# Patient Record
Sex: FEMALE | Race: WHITE | NOT HISPANIC OR LATINO | Employment: UNEMPLOYED | URBAN - NONMETROPOLITAN AREA
[De-identification: names, ages, dates, MRNs, and addresses within clinical notes are randomized per-mention and may not be internally consistent; named-entity substitution may affect disease eponyms.]

---

## 2018-09-17 ENCOUNTER — OFFICE VISIT (OUTPATIENT)
Dept: INTERNAL MEDICINE | Facility: CLINIC | Age: 43
End: 2018-09-17

## 2018-09-17 VITALS
HEIGHT: 64 IN | OXYGEN SATURATION: 99 % | TEMPERATURE: 98.2 F | SYSTOLIC BLOOD PRESSURE: 110 MMHG | WEIGHT: 134 LBS | HEART RATE: 91 BPM | DIASTOLIC BLOOD PRESSURE: 82 MMHG | BODY MASS INDEX: 22.88 KG/M2

## 2018-09-17 DIAGNOSIS — R53.83 FATIGUE, UNSPECIFIED TYPE: ICD-10-CM

## 2018-09-17 DIAGNOSIS — R00.2 PALPITATIONS: ICD-10-CM

## 2018-09-17 DIAGNOSIS — F41.8 DEPRESSION WITH ANXIETY: ICD-10-CM

## 2018-09-17 DIAGNOSIS — Z76.89 ENCOUNTER TO ESTABLISH CARE: Primary | ICD-10-CM

## 2018-09-17 DIAGNOSIS — K21.9 GASTROESOPHAGEAL REFLUX DISEASE, ESOPHAGITIS PRESENCE NOT SPECIFIED: ICD-10-CM

## 2018-09-17 DIAGNOSIS — Z71.6 ENCOUNTER FOR SMOKING CESSATION COUNSELING: ICD-10-CM

## 2018-09-17 DIAGNOSIS — G47.00 INSOMNIA, UNSPECIFIED TYPE: ICD-10-CM

## 2018-09-17 DIAGNOSIS — M25.50 POLYARTHRALGIA: ICD-10-CM

## 2018-09-17 DIAGNOSIS — Z00.00 PREVENTATIVE HEALTH CARE: ICD-10-CM

## 2018-09-17 DIAGNOSIS — H04.123 DRY EYES: ICD-10-CM

## 2018-09-17 PROCEDURE — 99204 OFFICE O/P NEW MOD 45 MIN: CPT | Performed by: PHYSICIAN ASSISTANT

## 2018-09-17 PROCEDURE — 99406 BEHAV CHNG SMOKING 3-10 MIN: CPT | Performed by: PHYSICIAN ASSISTANT

## 2018-09-17 RX ORDER — MELOXICAM 7.5 MG/1
TABLET ORAL
COMMUNITY
Start: 2018-09-07 | End: 2019-01-18

## 2018-09-17 RX ORDER — SERTRALINE HYDROCHLORIDE 25 MG/1
25 TABLET, FILM COATED ORAL DAILY
Qty: 30 TABLET | Refills: 5 | Status: SHIPPED | OUTPATIENT
Start: 2018-09-17 | End: 2018-10-18 | Stop reason: SDUPTHER

## 2018-09-17 NOTE — PROGRESS NOTES
Subjective   Phuong Becker is a 43 y.o. female who presents to \[facility]\"" care.    Chief Complaint:  Palpitations- first occurred 3-4 years ago and occurs 2-3 times per year until the last year when it began happening 1-2 times per month. Around 10 days ago it was bothersome multiple times per day over 5-6 days in a row. She denies any chest pain but has to force herself to take a deep breath to catch her breath when palpitations occur.     Fatigue- years.     Dizziness- gets dizzy in cars and with repetitive loud noises such as fireworks. Occasionally has ringing in ears.  possible ear involvement. In the last 5 years she has had increased trouble with ears. Wakes occasionally with dried discharge from the left ear. Both ears often feel full.     Aches and pains- frequently shoulders ache, left worse than right, ongoing for the last 2 years. She remembers injuring the left shoulder 2 years ago which produced pain for a month then improved to a tolerable level and has remained with occasional flare up of worse pain.     Headaches- has nausea with driving at night due to oncoming lights. Gets headaches with certain smells and repetitive sounds.     Hives- randomly breaks out in hives, bothersome for the last couple of years. She is unsure what sets if off. She takes benadryl which helps itching but hives tend to last a few hours up to 3 days.     Insomnia- has had trouble sleeping for the last 5 years or so. Gets 3-4 hours of sleep most nights but sometimes 7 hours. She uses melatonin sometimes but has not tried anything else.     Smoker- smokes around 13 cigarettes per day.  She is interested in trying Chantix to quit. She previously tried patches which did not help her quit.     Heartburn- has heartburn constantly which worsens with everything she eats. She takes Zantac 2-3 times daily which helps short term.           The following portions of the patient's history were reviewed and updated as appropriate:  She  has a past medical history of Anemia; Depression; Diverticulosis; and GERD (gastroesophageal reflux disease).  She  does not have any pertinent problems on file.  She  has a past surgical history that includes Endometrial ablation and Total abdominal hysterectomy w/ bilateral salpingoophorectomy.  Her family history includes Cervical cancer in her maternal aunt; Diabetes in her maternal aunt, maternal uncle, paternal aunt, and paternal uncle; SHAHID disease in her mother; Heart attack in her cousin; Hyperlipidemia in her mother; Hypertension in her mother and paternal uncle; Migraines in her sister; Osteoporosis in her mother; Ovarian cancer in her maternal aunt.  She  reports that she has been smoking.  She has been smoking about 0.50 packs per day. She has never used smokeless tobacco. She reports that she drinks alcohol. She reports that she does not use drugs.  Current Outpatient Prescriptions   Medication Sig Dispense Refill   • meloxicam (MOBIC) 7.5 MG tablet      • sertraline (ZOLOFT) 25 MG tablet Take 1 tablet by mouth Daily. 30 tablet 5   • varenicline (CHANTIX STARTING MONTH PAK) 0.5 MG X 11 & 1 MG X 42 tablet Take 0.5 mg one daily on days 1-3 and and 0.5 mg twice daily on days 4-7.Then 1 mg twice daily for a total of 12 weeks. 53 tablet 0     No current facility-administered medications for this visit.      Review of Systems  Review of Systems   Constitutional: Positive for fatigue and unexpected weight change. Negative for appetite change, chills and fever.        Malaise   HENT: Positive for ear discharge, ear pain (fullness) and hearing loss. Negative for congestion, nosebleeds, rhinorrhea, sinus pressure, sore throat, trouble swallowing and voice change.    Eyes: Positive for redness, itching and visual disturbance. Negative for pain and discharge.        Dry eyes   Respiratory: Positive for shortness of breath (with exertion). Negative for apnea, cough, chest tightness and wheezing.         No SOB  "lying down   Cardiovascular: Positive for palpitations. Negative for chest pain and leg swelling.        No leg cramps     Gastrointestinal: Positive for constipation and nausea (with headaches). Negative for abdominal pain, blood in stool, diarrhea and vomiting.        No change in bowel habits  Frequent heartburn   Endocrine: Positive for polydipsia. Negative for cold intolerance, heat intolerance, polyphagia and polyuria.        Hot flashes  No muscle weakness  No feeling of weakness   Genitourinary: Negative for difficulty urinating, dyspareunia, dysuria, frequency, hematuria, menstrual problem, pelvic pain, urgency, vaginal discharge and vaginal pain.        No urinary incontinence  No change in periods   Musculoskeletal: Positive for arthralgias and myalgias. Negative for joint swelling.        No limb pain  No limb swelling  Chronic pain   Skin: Negative for color change, rash and wound.        No rash  No lesions  No change in mole  No itching   Allergic/Immunologic: Negative.    Neurological: Positive for dizziness and headaches. Negative for seizures, syncope, weakness and numbness.        No confusion  No tingling  No trouble walking   Hematological: Negative for adenopathy. Does not bruise/bleed easily.   Psychiatric/Behavioral: Positive for dysphoric mood and sleep disturbance. Negative for confusion and suicidal ideas. The patient is nervous/anxious.         No change in personality         Objective    /82   Pulse 91   Temp 98.2 °F (36.8 °C)   Ht 162.6 cm (64\")   Wt 60.8 kg (134 lb)   SpO2 99%   BMI 23.00 kg/m²   Physical Exam   Constitutional: She is oriented to person, place, and time. She appears well-developed and well-nourished. No distress.   HENT:   Head: Normocephalic and atraumatic.   Right Ear: External ear normal.   Left Ear: External ear normal.   Nose: Nose normal.   Mouth/Throat: Oropharynx is clear and moist.   Eyes: Pupils are equal, round, and reactive to light. EOM are " normal. No scleral icterus.   Neck: Normal range of motion. Neck supple. No thyromegaly present.   Cardiovascular: Normal rate, regular rhythm and normal heart sounds.  Exam reveals no gallop and no friction rub.    No murmur heard.  Pulmonary/Chest: Effort normal and breath sounds normal. No respiratory distress. She has no wheezes. She has no rales. She exhibits no tenderness.   Abdominal: Soft. Bowel sounds are normal. She exhibits no distension and no mass. There is no tenderness. No hernia.   Musculoskeletal: Normal range of motion. She exhibits no edema, tenderness or deformity.   Lymphadenopathy:     She has no cervical adenopathy.   Neurological: She is alert and oriented to person, place, and time. She displays normal reflexes. No cranial nerve deficit or sensory deficit.   Skin: Skin is warm and dry. Capillary refill takes less than 2 seconds. No rash noted. She is not diaphoretic. No erythema. No pallor.   Psychiatric: She has a normal mood and affect. Her behavior is normal. Judgment and thought content normal.   Nursing note and vitals reviewed.        Assessment/Plan      Diagnosis Plan   1. Encounter to establish care     2. Palpitations  Comprehensive Metabolic Panel    TSH    Magnesium  - possibly anxiety related.      3. Polyarthralgia  ILEANA     4. Insomnia, unspecified type  TSH  - may improve with Zoloft use. Revisit in 1 month.      5. Encounter for smoking cessation counseling  varenicline (CHANTIX STARTING MONTH PAK) 0.5 MG X 11 & 1 MG X 42 tablet  - spent 5 minutes discussing smoking cessation methods. I advised the patient of the risks in continuing to use tobacco.  I provided the patient with smoking cessation educational materials as well as discussed methods to quit smoking and patient has epxressed the willingness to quit.      6. Fatigue, unspecified type  Vitamin D 25 Hydroxy    TSH    CBC & Differential     7. Gastroesophageal reflux disease, esophagitis presence not specified   Helicobacter Pylori, IgA IgG IgM  Taking excessive doses of Zantac. Will wait until labs result before considering PPI use.      8. Dry eyes  ILEANA  - Recommended eye exam.      9. Depression with anxiety  Begin: sertraline (ZOLOFT) 25 MG tablet  - Risks, benefits and potential side effects of medication reviewed and patient agrees to use.      10. Preventative health care  Lipid Panel    Comprehensive Metabolic Panel            Return in about 1 month (around 10/17/2018) for Next scheduled follow up.

## 2018-09-18 LAB
25(OH)D3+25(OH)D2 SERPL-MCNC: 45.9 NG/ML
ALBUMIN SERPL-MCNC: 4.4 G/DL (ref 3.5–5)
ALBUMIN/GLOB SERPL: 1.8 G/DL (ref 1–2)
ALP SERPL-CCNC: 134 U/L (ref 38–126)
ALT SERPL-CCNC: 26 U/L (ref 13–69)
ANA SER QL: NEGATIVE
AST SERPL-CCNC: 23 U/L (ref 15–46)
BASOPHILS # BLD AUTO: 0.04 10*3/MM3 (ref 0–0.2)
BASOPHILS NFR BLD AUTO: 0.4 % (ref 0–2.5)
BILIRUB SERPL-MCNC: 0.9 MG/DL (ref 0.2–1.3)
BUN SERPL-MCNC: 7 MG/DL (ref 7–20)
BUN/CREAT SERPL: 10 (ref 7.1–23.5)
CALCIUM SERPL-MCNC: 9.7 MG/DL (ref 8.4–10.2)
CHLORIDE SERPL-SCNC: 105 MMOL/L (ref 98–107)
CHOLEST SERPL-MCNC: 168 MG/DL (ref 0–199)
CO2 SERPL-SCNC: 25 MMOL/L (ref 26–30)
CREAT SERPL-MCNC: 0.7 MG/DL (ref 0.6–1.3)
EOSINOPHIL # BLD AUTO: 0.28 10*3/MM3 (ref 0–0.7)
EOSINOPHIL NFR BLD AUTO: 2.7 % (ref 0–7)
ERYTHROCYTE [DISTWIDTH] IN BLOOD BY AUTOMATED COUNT: 13.6 % (ref 11.5–14.5)
GLOBULIN SER CALC-MCNC: 2.4 GM/DL
GLUCOSE SERPL-MCNC: 97 MG/DL (ref 74–98)
H PYLORI IGA SER-ACNC: <9 UNITS (ref 0–8.9)
H PYLORI IGG SER IA-ACNC: 0.29 INDEX VALUE (ref 0–0.79)
H PYLORI IGM SER-ACNC: <9 UNITS (ref 0–8.9)
HCT VFR BLD AUTO: 41.9 % (ref 37–47)
HDLC SERPL-MCNC: 51 MG/DL (ref 40–60)
HGB BLD-MCNC: 14.1 G/DL (ref 12–16)
IMM GRANULOCYTES # BLD: 0.02 10*3/MM3 (ref 0–0.06)
IMM GRANULOCYTES NFR BLD: 0.2 % (ref 0–0.6)
LDLC SERPL CALC-MCNC: 89 MG/DL (ref 0–99)
LYMPHOCYTES # BLD AUTO: 3.4 10*3/MM3 (ref 0.6–3.4)
LYMPHOCYTES NFR BLD AUTO: 32.8 % (ref 10–50)
MAGNESIUM SERPL-MCNC: 2.2 MG/DL (ref 1.6–2.3)
MCH RBC QN AUTO: 30.1 PG (ref 27–31)
MCHC RBC AUTO-ENTMCNC: 33.7 G/DL (ref 30–37)
MCV RBC AUTO: 89.5 FL (ref 81–99)
MONOCYTES # BLD AUTO: 0.64 10*3/MM3 (ref 0–0.9)
MONOCYTES NFR BLD AUTO: 6.2 % (ref 0–12)
NEUTROPHILS # BLD AUTO: 5.99 10*3/MM3 (ref 2–6.9)
NEUTROPHILS NFR BLD AUTO: 57.7 % (ref 37–80)
NRBC BLD AUTO-RTO: 0 /100 WBC (ref 0–0)
PLATELET # BLD AUTO: 240 10*3/MM3 (ref 130–400)
POTASSIUM SERPL-SCNC: 4 MMOL/L (ref 3.5–5.1)
PROT SERPL-MCNC: 6.8 G/DL (ref 6.3–8.2)
RBC # BLD AUTO: 4.68 10*6/MM3 (ref 4.2–5.4)
SODIUM SERPL-SCNC: 142 MMOL/L (ref 137–145)
TRIGL SERPL-MCNC: 141 MG/DL
TSH SERPL DL<=0.005 MIU/L-ACNC: 2.49 MIU/ML (ref 0.47–4.68)
VLDLC SERPL CALC-MCNC: 28.2 MG/DL
WBC # BLD AUTO: 10.37 10*3/MM3 (ref 4.8–10.8)

## 2018-10-18 ENCOUNTER — OFFICE VISIT (OUTPATIENT)
Dept: INTERNAL MEDICINE | Facility: CLINIC | Age: 43
End: 2018-10-18

## 2018-10-18 VITALS
DIASTOLIC BLOOD PRESSURE: 74 MMHG | OXYGEN SATURATION: 98 % | TEMPERATURE: 98.6 F | HEIGHT: 64 IN | HEART RATE: 106 BPM | BODY MASS INDEX: 23.22 KG/M2 | WEIGHT: 136 LBS | SYSTOLIC BLOOD PRESSURE: 108 MMHG

## 2018-10-18 DIAGNOSIS — F41.8 DEPRESSION WITH ANXIETY: Primary | ICD-10-CM

## 2018-10-18 DIAGNOSIS — R29.818 SUSPECTED SLEEP APNEA: ICD-10-CM

## 2018-10-18 DIAGNOSIS — F51.04 PSYCHOPHYSIOLOGICAL INSOMNIA: ICD-10-CM

## 2018-10-18 DIAGNOSIS — R40.0 DAYTIME SLEEPINESS: ICD-10-CM

## 2018-10-18 PROCEDURE — 99213 OFFICE O/P EST LOW 20 MIN: CPT | Performed by: PHYSICIAN ASSISTANT

## 2018-10-18 RX ORDER — AMITRIPTYLINE HYDROCHLORIDE 25 MG/1
TABLET, FILM COATED ORAL
Qty: 30 TABLET | Refills: 5 | Status: SHIPPED | OUTPATIENT
Start: 2018-10-18 | End: 2018-11-27 | Stop reason: SDUPTHER

## 2018-10-18 RX ORDER — VARENICLINE TARTRATE 1 MG/1
1 TABLET, FILM COATED ORAL 2 TIMES DAILY
Qty: 60 TABLET | Refills: 5 | Status: SHIPPED | OUTPATIENT
Start: 2018-10-18 | End: 2019-04-18

## 2018-10-18 NOTE — PROGRESS NOTES
Phuong Becker is a 43 y.o. female.     Subjective   History of Present Illness   Here today for follow up of depression with anxiety. She began Zoloft one month ago.  She has had no more palpations since starting Zoloft. She feels there has been a little improvement in anxiety, dysphoric mood and insomnia. When she does sleep well she still feels tired.  She is unsure if she snores.       The following portions of the patient's history were reviewed and updated as appropriate: allergies, current medications, past family history, past medical history, past social history, past surgical history and problem list.    Review of Systems    Constitutional: fatigue.  Negative for appetite change, chills, fever and unexpected weight change.   HENT: Negative for congestion, ear pain, hearing loss, nosebleeds, postnasal drip, rhinorrhea, sore throat, tinnitus and trouble swallowing.    Eyes: Negative for photophobia, discharge and visual disturbance.   Respiratory: Negative for cough, chest tightness, shortness of breath and wheezing.    Cardiovascular: Negative for chest pain, palpitations and leg swelling.   Gastrointestinal: Negative for abdominal distention, abdominal pain, blood in stool, constipation, diarrhea, nausea and vomiting.   Endocrine: Negative for cold intolerance, heat intolerance, polydipsia, polyphagia and polyuria.   Musculoskeletal: Negative for back pain, joint swelling, myalgias, neck pain and neck stiffness.   Skin: Negative for color change, pallor, rash and wound.   Allergic/Immunologic: Negative for environmental allergies, food allergies and immunocompromised state.   Neurological: Negative for dizziness, tremors, seizures, weakness, numbness and headaches.   Hematological: Negative for adenopathy. Does not bruise/bleed easily.   Psychiatric/Behavioral: sleep disturbances, agitation, anxious, dysphoric mood. Negative for behavioral problems, confusion, hallucinations, self-injury and suicidal  "ideas.     Objective    Physical Exam  Constitutional:  Appears well-developed and well-nourished.   HENT: crowded oropharynx, large tongue.  Cardiovascular: Normal rate, regular rhythm and normal heart sounds.    Pulmonary/Chest: Effort normal and breath sounds normal. No respiratory distress.  Has no wheezes or rales. Exhibits no chest wall tenderness.   Abdominal: Soft. Bowel sounds are normal. Exhibits no distension and no mass. There is no tenderness.   Musculoskeletal: Normal range of motion. Exhibits no tenderness.   Neurological: Alert and oriented to person, place, and time.   Skin: Skin is warm and dry.   Psychiatric: Has a normal mood and flat affect. Behavior is normal. Judgment and thought content normal.       /74   Pulse 106   Temp 98.6 °F (37 °C)   Ht 162.6 cm (64.02\")   Wt 61.7 kg (136 lb)   SpO2 98%   BMI 23.33 kg/m²     Nursing note and vitals reviewed.        Assessment/Plan   Phuong was seen today for follow-up.    Diagnoses and all orders for this visit:    Depression with anxiety  -     Dose increase: sertraline (ZOLOFT) 50 MG tablet; Take 1 tablet by mouth Daily.    Psychophysiological insomnia  -     Begin: amitriptyline (ELAVIL) 25 MG tablet; Take 1/2 to 1 tablet by mouth 30 minutes before bedtime for sleep.  Risks, benefits and potential side effects of medication reviewed and patient agrees to use.     Suspected sleep apnea  Daytime sleepiness  -     Ambulatory Referral to Sleep Medicine      F/u 3 months or sooner if needed.              "

## 2018-11-27 DIAGNOSIS — F51.04 PSYCHOPHYSIOLOGICAL INSOMNIA: ICD-10-CM

## 2018-11-27 DIAGNOSIS — F41.8 DEPRESSION WITH ANXIETY: ICD-10-CM

## 2018-11-27 RX ORDER — AMITRIPTYLINE HYDROCHLORIDE 25 MG/1
TABLET, FILM COATED ORAL
Qty: 90 TABLET | Refills: 1 | Status: SHIPPED | OUTPATIENT
Start: 2018-11-27 | End: 2018-11-30 | Stop reason: SDUPTHER

## 2018-11-30 DIAGNOSIS — F41.8 DEPRESSION WITH ANXIETY: ICD-10-CM

## 2018-11-30 DIAGNOSIS — F51.04 PSYCHOPHYSIOLOGICAL INSOMNIA: ICD-10-CM

## 2018-11-30 RX ORDER — AMITRIPTYLINE HYDROCHLORIDE 25 MG/1
TABLET, FILM COATED ORAL
Qty: 90 TABLET | Refills: 1 | Status: SHIPPED | OUTPATIENT
Start: 2018-11-30 | End: 2019-01-18 | Stop reason: ALTCHOICE

## 2019-01-18 ENCOUNTER — OFFICE VISIT (OUTPATIENT)
Dept: INTERNAL MEDICINE | Facility: CLINIC | Age: 44
End: 2019-01-18

## 2019-01-18 VITALS
HEIGHT: 64 IN | OXYGEN SATURATION: 98 % | WEIGHT: 139 LBS | HEART RATE: 80 BPM | DIASTOLIC BLOOD PRESSURE: 76 MMHG | BODY MASS INDEX: 23.73 KG/M2 | TEMPERATURE: 98.6 F | SYSTOLIC BLOOD PRESSURE: 106 MMHG

## 2019-01-18 DIAGNOSIS — F41.8 DEPRESSION WITH ANXIETY: Primary | ICD-10-CM

## 2019-01-18 DIAGNOSIS — Z23 NEED FOR PROPHYLACTIC VACCINATION AGAINST HEPATITIS A: ICD-10-CM

## 2019-01-18 DIAGNOSIS — F51.04 PSYCHOPHYSIOLOGICAL INSOMNIA: ICD-10-CM

## 2019-01-18 PROCEDURE — 99213 OFFICE O/P EST LOW 20 MIN: CPT | Performed by: PHYSICIAN ASSISTANT

## 2019-01-18 PROCEDURE — 90632 HEPA VACCINE ADULT IM: CPT | Performed by: PHYSICIAN ASSISTANT

## 2019-01-18 PROCEDURE — 90471 IMMUNIZATION ADMIN: CPT | Performed by: PHYSICIAN ASSISTANT

## 2019-01-18 RX ORDER — TRAZODONE HYDROCHLORIDE 100 MG/1
TABLET ORAL
Qty: 90 TABLET | Refills: 1 | Status: SHIPPED | OUTPATIENT
Start: 2019-01-18 | End: 2019-04-18 | Stop reason: SDUPTHER

## 2019-01-18 RX ORDER — DESVENLAFAXINE SUCCINATE 50 MG/1
50 TABLET, EXTENDED RELEASE ORAL DAILY
Qty: 90 TABLET | Refills: 1 | Status: SHIPPED | OUTPATIENT
Start: 2019-01-18 | End: 2019-04-18 | Stop reason: SDUPTHER

## 2019-01-18 NOTE — PROGRESS NOTES
"Phuong Becker is a 43 y.o. female.     Subjective   History of Present Illness   Here today for follow up of depression and anxiety. 3 months ago we increased Zoloft dose but she did not see any significant improvement. She also reports low-libido since starting Zoloft so she is interested in trying something else. She has previously used Paxil, Wellbutrin and Celexa which she did not do well with.  She has continued to not sleep well despite Elavil use.  She has tried trazodone in the past which did not help sleep but she cannot remember what dose she used.         The following portions of the patient's history were reviewed and updated as appropriate: allergies, current medications, past family history, past medical history, past social history, past surgical history and problem list.    Review of Systems    Constitutional: Negative for appetite change, chills, fatigue, fever and unexpected weight change.   Respiratory: Negative for cough, chest tightness, shortness of breath and wheezing.    Cardiovascular: Negative for chest pain, palpitations and leg swelling.   Endocrine: decreased libido. Negative for cold intolerance, heat intolerance, polydipsia, polyphagia and polyuria.   Psychiatric/Behavioral: dysphoric mood, anxious, sleep disturbances. Negative for agitation, behavioral problems, confusion, hallucinations, self-injury and suicidal ideas.     Objective    Physical Exam  Constitutional: Appears well-developed and well-nourished.   Cardiovascular: Normal rate, regular rhythm and normal heart sounds.    Pulmonary/Chest: Effort normal and breath sounds normal. No respiratory distress.  Has no wheezes or rales. Exhibits no chest wall tenderness.   Psychiatric: Has a normal mood and affect. Behavior is normal. Judgment and thought content normal.       /76   Pulse 80   Temp 98.6 °F (37 °C)   Ht 162.6 cm (64.02\")   Wt 63 kg (139 lb)   SpO2 98%   BMI 23.85 kg/m²     Nursing note and vitals " reviewed.        Assessment/Plan   Phuong was seen today for follow-up.    Diagnoses and all orders for this visit:    Depression with anxiety  -     desvenlafaxine (PRISTIQ) 50 MG 24 hr tablet; Take 1 tablet by mouth Daily.  Change from Zoloft due to lack of improvement as well as side effect of low-libido.     Psychophysiological insomnia  -     traZODone (DESYREL) 100 MG tablet; Take 1/2 to 1 tablet 30 minutes before bedtime as needed for sleep.  Change from Elavil due to lack of efficacy.     Need for prophylactic vaccination against hepatitis A  -     Hepatitis A Vaccine Adult IM      Return in 3 months for physical and follow up.

## 2019-03-12 ENCOUNTER — TELEPHONE (OUTPATIENT)
Dept: INTERNAL MEDICINE | Facility: CLINIC | Age: 44
End: 2019-03-12

## 2019-03-12 RX ORDER — RANITIDINE 150 MG/1
150 TABLET ORAL 2 TIMES DAILY PRN
Qty: 60 TABLET | Refills: 5 | Status: SHIPPED | OUTPATIENT
Start: 2019-03-12 | End: 2019-04-18 | Stop reason: SDUPTHER

## 2019-03-14 ENCOUNTER — OFFICE VISIT (OUTPATIENT)
Dept: INTERNAL MEDICINE | Facility: CLINIC | Age: 44
End: 2019-03-14

## 2019-03-14 VITALS
WEIGHT: 142 LBS | HEIGHT: 64 IN | BODY MASS INDEX: 24.24 KG/M2 | DIASTOLIC BLOOD PRESSURE: 94 MMHG | OXYGEN SATURATION: 98 % | HEART RATE: 102 BPM | TEMPERATURE: 98.9 F | SYSTOLIC BLOOD PRESSURE: 136 MMHG

## 2019-03-14 DIAGNOSIS — R00.2 PALPITATIONS: Primary | ICD-10-CM

## 2019-03-14 DIAGNOSIS — R03.0 ELEVATED BLOOD PRESSURE READING IN OFFICE WITHOUT DIAGNOSIS OF HYPERTENSION: ICD-10-CM

## 2019-03-14 DIAGNOSIS — K21.9 GASTROESOPHAGEAL REFLUX DISEASE, ESOPHAGITIS PRESENCE NOT SPECIFIED: ICD-10-CM

## 2019-03-14 PROCEDURE — 99214 OFFICE O/P EST MOD 30 MIN: CPT | Performed by: PHYSICIAN ASSISTANT

## 2019-03-14 RX ORDER — ATENOLOL 25 MG/1
TABLET ORAL
COMMUNITY
Start: 2019-03-13 | End: 2019-04-18

## 2019-03-14 RX ORDER — OMEPRAZOLE 40 MG/1
40 CAPSULE, DELAYED RELEASE ORAL DAILY
Qty: 14 CAPSULE | Refills: 0 | Status: SHIPPED | OUTPATIENT
Start: 2019-03-14 | End: 2019-04-02 | Stop reason: SDUPTHER

## 2019-03-14 NOTE — PROGRESS NOTES
Phuong Becker is a 43 y.o. female.     Subjective   History of Present Illness   Here today with concern of palpitations and a pounding sensation in her chest for the last few days. She went to [place] [facility] yesterday and reports that her BP there was elevated.  Orthostatics were checked with the following readings: 138/90 supine, 160/104 sitting and 158/103 standing. Dizziness was elicited with position changes to sitting and standing.  Labs, EKG and chest XR were normal except for a PAC on EKG.  She was given Metoprolol in the ER which reduced her BP to 124/80s after 1-2 hours.   The ER provider prescribed atenolol which she has not yet taken.  She quit smoking 3.5 weeks ago and continues to take Chantix most days but had cut back due to concern of dizziness.  The last 2 days she has no longer felt dizzy but has continued to have some palpitations and chest pounding sensation.  She has also had acutely worsened acid reflux for the last week which Zantac does helps some.  No abdominal pain, constipation, diarrhea or vomiting but she did have a little nausea on the day of the ER visit.  She denies any increased stress and has felt better overall since changing to Pristiq a few months ago.           The following portions of the patient's history were reviewed and updated as appropriate: allergies, current medications, past family history, past medical history, past social history, past surgical history and problem list.    Review of Systems   Constitutional: Negative for appetite change, chills, fatigue, fever and unexpected weight change.   HENT: Negative for congestion, ear pain, hearing loss, nosebleeds, postnasal drip, rhinorrhea, sore throat, tinnitus and trouble swallowing.    Eyes: Negative for photophobia, discharge and visual disturbance.   Respiratory: Positive for chest tightness and shortness of breath. Negative for cough and wheezing.    Cardiovascular: Positive for chest pain and palpitations.  Negative for leg swelling.   Gastrointestinal: Positive for nausea. Negative for abdominal distention, abdominal pain, blood in stool, constipation, diarrhea and vomiting.        Acid reflux.    Endocrine: Negative for cold intolerance, heat intolerance, polydipsia, polyphagia and polyuria.   Genitourinary: Negative.    Musculoskeletal: Negative for arthralgias, back pain, joint swelling, myalgias, neck pain and neck stiffness.   Skin: Negative for color change, pallor, rash and wound.   Allergic/Immunologic: Negative for environmental allergies, food allergies and immunocompromised state.   Neurological: Negative for dizziness, tremors, seizures, weakness, numbness and headaches.   Hematological: Negative for adenopathy. Does not bruise/bleed easily.   Psychiatric/Behavioral: Negative for agitation, behavioral problems, confusion, dysphoric mood, hallucinations, self-injury and suicidal ideas. The patient is not nervous/anxious.          Objective    Physical Exam   Constitutional: She is oriented to person, place, and time. She appears well-developed and well-nourished. No distress.   HENT:   Head: Normocephalic and atraumatic.   Right Ear: External ear normal.   Left Ear: External ear normal.   Nose: Nose normal.   Mouth/Throat: Oropharynx is clear and moist. No oropharyngeal exudate.   Eyes: Conjunctivae and EOM are normal. Pupils are equal, round, and reactive to light. No scleral icterus.   Neck: Normal range of motion. Neck supple. No thyromegaly present.   Cardiovascular: Normal rate, regular rhythm and normal heart sounds. Exam reveals no gallop and no friction rub.   No murmur heard.  Pulmonary/Chest: Effort normal and breath sounds normal. No respiratory distress. She has no wheezes. She has no rales. She exhibits no tenderness.   Abdominal: Soft. Bowel sounds are normal. She exhibits no distension and no mass. There is no tenderness. There is no rebound.   Musculoskeletal: Normal range of motion. She  "exhibits no edema, tenderness or deformity.   Lymphadenopathy:     She has no cervical adenopathy.   Neurological: She is alert and oriented to person, place, and time. She displays normal reflexes. No cranial nerve deficit or sensory deficit.   Skin: Skin is warm and dry. Capillary refill takes less than 2 seconds. No rash noted. She is not diaphoretic. No pallor.   Psychiatric: She has a normal mood and affect. Her behavior is normal. Judgment and thought content normal.   Nursing note and vitals reviewed.        /94   Pulse 102   Temp 98.9 °F (37.2 °C)   Ht 162.6 cm (64.02\")   Wt 64.4 kg (142 lb)   SpO2 98%   BMI 24.36 kg/m²     Nursing note and vitals reviewed.        Assessment/Plan   Phuong was seen today for follow-up.    Diagnoses and all orders for this visit:    Palpitations    Gastroesophageal reflux disease, esophagitis presence not specified  -     omeprazole (priLOSEC) 40 MG capsule; Take 1 capsule by mouth Daily.    Elevated blood pressure reading in office without diagnosis of hypertension      ER record reviewed. EKG, labs and chest x-ray unremarkable.     DDx: GERD, hiatal hernia, anxiety, other etiology.     Recommended omeprazole 40 mg daily and re-evaluate in 1 week or sooner if symptoms worsen.  If symptoms persist we will consider cardiology referral as well as referral for EGD. Patient is agreeable with this plan. Hold off from taking atenolol prescribed in ER for now since BP and palpitations have already improved some in the last 2 days without treatment.     Proceed to ER with any concerning new or worsening symptoms.           "

## 2019-04-02 DIAGNOSIS — K21.9 GASTROESOPHAGEAL REFLUX DISEASE, ESOPHAGITIS PRESENCE NOT SPECIFIED: Primary | ICD-10-CM

## 2019-04-02 DIAGNOSIS — K21.9 GASTROESOPHAGEAL REFLUX DISEASE, ESOPHAGITIS PRESENCE NOT SPECIFIED: ICD-10-CM

## 2019-04-02 DIAGNOSIS — R10.13 EPIGASTRIC PAIN: ICD-10-CM

## 2019-04-02 RX ORDER — OMEPRAZOLE 40 MG/1
40 CAPSULE, DELAYED RELEASE ORAL DAILY
Qty: 30 CAPSULE | Refills: 2 | Status: SHIPPED | OUTPATIENT
Start: 2019-04-02 | End: 2019-04-18 | Stop reason: SDUPTHER

## 2019-04-09 ENCOUNTER — LAB REQUISITION (OUTPATIENT)
Dept: LAB | Facility: HOSPITAL | Age: 44
End: 2019-04-09

## 2019-04-09 ENCOUNTER — OUTSIDE FACILITY SERVICE (OUTPATIENT)
Dept: GASTROENTEROLOGY | Facility: CLINIC | Age: 44
End: 2019-04-09

## 2019-04-09 DIAGNOSIS — R12 HEARTBURN: ICD-10-CM

## 2019-04-09 DIAGNOSIS — K21.9 GASTRO-ESOPHAGEAL REFLUX DISEASE WITHOUT ESOPHAGITIS: ICD-10-CM

## 2019-04-09 PROCEDURE — 43239 EGD BIOPSY SINGLE/MULTIPLE: CPT | Performed by: INTERNAL MEDICINE

## 2019-04-09 PROCEDURE — 88305 TISSUE EXAM BY PATHOLOGIST: CPT | Performed by: INTERNAL MEDICINE

## 2019-04-11 ENCOUNTER — TELEPHONE (OUTPATIENT)
Dept: GASTROENTEROLOGY | Facility: CLINIC | Age: 44
End: 2019-04-11

## 2019-04-11 PROBLEM — K22.70 BARRETT'S ESOPHAGUS WITHOUT DYSPLASIA: Chronic | Status: ACTIVE | Noted: 2019-04-11

## 2019-04-11 NOTE — TELEPHONE ENCOUNTER
----- Message from Hugo Watts MD sent at 4/10/2019  2:19 PM EDT -----  Let Mrs. Becker know there were changes of reflux and a short segment of Dillard's esophagus.  She should take the omeprazole medication on a daily basis.  The patient should follow-up in the office in 6 months.  Thank you,  ARIANNA

## 2019-04-16 LAB
CYTO UR: NORMAL
LAB AP CASE REPORT: NORMAL
LAB AP CLINICAL INFORMATION: NORMAL
LAB AP DIAGNOSIS COMMENT: NORMAL
PATH REPORT.ADDENDUM SPEC: NORMAL
PATH REPORT.FINAL DX SPEC: NORMAL
PATH REPORT.GROSS SPEC: NORMAL

## 2019-04-18 ENCOUNTER — OFFICE VISIT (OUTPATIENT)
Dept: INTERNAL MEDICINE | Facility: CLINIC | Age: 44
End: 2019-04-18

## 2019-04-18 VITALS
TEMPERATURE: 98.7 F | HEIGHT: 64 IN | OXYGEN SATURATION: 98 % | DIASTOLIC BLOOD PRESSURE: 80 MMHG | SYSTOLIC BLOOD PRESSURE: 122 MMHG | BODY MASS INDEX: 24.92 KG/M2 | WEIGHT: 146 LBS | HEART RATE: 95 BPM

## 2019-04-18 DIAGNOSIS — F41.8 DEPRESSION WITH ANXIETY: ICD-10-CM

## 2019-04-18 DIAGNOSIS — K22.70 BARRETT'S ESOPHAGUS WITHOUT DYSPLASIA: Chronic | ICD-10-CM

## 2019-04-18 DIAGNOSIS — K21.00 GASTROESOPHAGEAL REFLUX DISEASE WITH ESOPHAGITIS: ICD-10-CM

## 2019-04-18 DIAGNOSIS — Z00.00 ANNUAL PHYSICAL EXAM: Primary | ICD-10-CM

## 2019-04-18 DIAGNOSIS — F51.04 PSYCHOPHYSIOLOGICAL INSOMNIA: ICD-10-CM

## 2019-04-18 PROBLEM — K21.9 GASTROESOPHAGEAL REFLUX DISEASE: Status: RESOLVED | Noted: 2018-09-17 | Resolved: 2019-04-18

## 2019-04-18 PROCEDURE — 99396 PREV VISIT EST AGE 40-64: CPT | Performed by: PHYSICIAN ASSISTANT

## 2019-04-18 RX ORDER — RANITIDINE 150 MG/1
150 TABLET ORAL 2 TIMES DAILY PRN
Qty: 180 TABLET | Refills: 1 | Status: SHIPPED | OUTPATIENT
Start: 2019-04-18 | End: 2019-05-06 | Stop reason: SDUPTHER

## 2019-04-18 RX ORDER — OMEPRAZOLE 40 MG/1
40 CAPSULE, DELAYED RELEASE ORAL DAILY
Qty: 90 CAPSULE | Refills: 3 | Status: SHIPPED | OUTPATIENT
Start: 2019-04-18 | End: 2019-05-06 | Stop reason: SDUPTHER

## 2019-04-18 RX ORDER — DESVENLAFAXINE SUCCINATE 50 MG/1
50 TABLET, EXTENDED RELEASE ORAL DAILY
Qty: 90 TABLET | Refills: 1 | Status: SHIPPED | OUTPATIENT
Start: 2019-04-18 | End: 2019-07-30 | Stop reason: SDUPTHER

## 2019-04-18 RX ORDER — TRAZODONE HYDROCHLORIDE 100 MG/1
TABLET ORAL
Qty: 90 TABLET | Refills: 1 | Status: SHIPPED | OUTPATIENT
Start: 2019-04-18 | End: 2020-06-08 | Stop reason: SDUPTHER

## 2019-04-18 NOTE — PROGRESS NOTES
Subjective   Phuong Becker is a 43 y.o. female and is here for a comprehensive physical exam. The patient reports no new problems.    HPI: She recently had EGD with Dr. Watts and was found to have Dillard's Esophagus and was directed to continue omeprazole 40 mg daily.  Since starting Omeprazole she denies any firther episodes of palpitations or chest pain.     Depression and anxiety seem to be well controlled Pristiq 50 mg daily. Insomnia is well controlled with trazodone nightly prn. She denies SI or HI.         Do you take any herbs or supplements that were not prescribed by a doctor? no  Are you taking calcium supplements? No  Are you taking aspirin daily? No     History:  LMP: No LMP recorded.  Menopause: Yes, surgical with total hysterectomy in 2017  Last pap date: 2016, no longer indicated.  Family history of breast or ovarian cancer: yes, maternal aunt  from ovarian cancer.          OB History    Para Term  AB Living   4 3 3   1 3   SAB TAB Ectopic Molar Multiple Live Births       1     3      # Outcome Date GA Lbr Louis/2nd Weight Sex Delivery Anes PTL Lv   4 Term            3 Term            2 Term            1 Ectopic                  reports that she currently engages in sexual activity. She reports using the following methods of birth control/protection: Post-menopausal and Surgical.        The following portions of the patient's history were reviewed and updated as appropriate: She  has a past medical history of Anemia, Dillard's esophagus without dysplasia (2019), Depression, Diverticulosis, Gastroesophageal reflux disease (2018), and GERD (gastroesophageal reflux disease).  She does not have any pertinent problems on file.  She  has a past surgical history that includes Endometrial ablation and Total abdominal hysterectomy w/ bilateral salpingoophorectomy.  Her family history includes Cervical cancer in her maternal aunt; Diabetes in her maternal aunt, maternal  uncle, paternal aunt, and paternal uncle; SHAHID disease in her mother; Heart attack in her cousin; Hyperlipidemia in her mother; Hypertension in her mother and paternal uncle; Migraines in her sister; Osteoporosis in her mother; Ovarian cancer in her maternal aunt.  She  reports that she quit smoking about 8 weeks ago. She smoked 0.50 packs per day. She has never used smokeless tobacco. She reports that she drinks alcohol. She reports that she does not use drugs.  Current Outpatient Medications   Medication Sig Dispense Refill   • desvenlafaxine (PRISTIQ) 50 MG 24 hr tablet Take 1 tablet by mouth Daily. 90 tablet 1   • omeprazole (priLOSEC) 40 MG capsule Take 1 capsule by mouth Daily. 90 capsule 3   • raNITIdine (ZANTAC) 150 MG tablet Take 1 tablet by mouth 2 (Two) Times a Day As Needed for Heartburn or Indigestion. 180 tablet 1   • traZODone (DESYREL) 100 MG tablet Take 1/2 to 1 tablet 30 minutes before bedtime as needed for sleep. 90 tablet 1     No current facility-administered medications for this visit.        Review of Systems  Do you have pain that bothers you in your daily life? no    Review of Systems   Constitutional: Negative for appetite change, chills, fatigue, fever and unexpected weight change.   HENT: Negative for congestion, ear pain, hearing loss, nosebleeds, postnasal drip, rhinorrhea, sore throat, tinnitus and trouble swallowing.    Eyes: Negative for photophobia, discharge and visual disturbance.   Respiratory: Negative for cough, chest tightness, shortness of breath and wheezing.    Cardiovascular: Negative for chest pain, palpitations and leg swelling.   Gastrointestinal: Negative for abdominal distention, abdominal pain, blood in stool, constipation, diarrhea, nausea and vomiting.        Acid reflux.    Endocrine: Negative for cold intolerance, heat intolerance, polydipsia, polyphagia and polyuria.   Genitourinary: Negative.    Musculoskeletal: Negative for arthralgias, back pain, joint  "swelling, myalgias, neck pain and neck stiffness.   Skin: Negative for color change, pallor, rash and wound.   Allergic/Immunologic: Negative for environmental allergies, food allergies and immunocompromised state.   Neurological: Negative for dizziness, tremors, seizures, weakness, numbness and headaches.   Hematological: Negative for adenopathy. Does not bruise/bleed easily.   Psychiatric/Behavioral: Negative for agitation, behavioral problems, confusion, hallucinations, self-injury and suicidal ideas. The patient is not nervous/anxious.          Objective   /80   Pulse 95   Temp 98.7 °F (37.1 °C)   Ht 162.6 cm (64.02\")   Wt 66.2 kg (146 lb)   SpO2 98%   BMI 25.05 kg/m²     Physical Exam   Constitutional: She is oriented to person, place, and time. She appears well-developed and well-nourished. No distress.   HENT:   Head: Normocephalic and atraumatic.   Right Ear: External ear normal.   Left Ear: External ear normal.   Nose: Nose normal.   Mouth/Throat: Oropharynx is clear and moist. No oropharyngeal exudate.   Eyes: Conjunctivae and EOM are normal. Pupils are equal, round, and reactive to light. No scleral icterus.   Neck: Normal range of motion. Neck supple. No thyromegaly present.   Cardiovascular: Normal rate, regular rhythm and normal heart sounds. Exam reveals no gallop and no friction rub.   No murmur heard.  Pulmonary/Chest: Effort normal and breath sounds normal. No respiratory distress. She has no wheezes. She has no rales. She exhibits no tenderness.   Abdominal: Soft. Bowel sounds are normal. She exhibits no distension and no mass. There is no tenderness. There is no rebound.   Musculoskeletal: Normal range of motion. She exhibits no edema, tenderness or deformity.   Lymphadenopathy:     She has no cervical adenopathy.   Neurological: She is alert and oriented to person, place, and time. She displays normal reflexes. No cranial nerve deficit or sensory deficit.   Skin: Skin is warm and " dry. Capillary refill takes less than 2 seconds. No rash noted. She is not diaphoretic. No pallor.   Psychiatric: She has a normal mood and affect. Her behavior is normal. Judgment and thought content normal.   Nursing note and vitals reviewed.         Assessment/Plan   Healthy female exam.     1.    Diagnosis Plan   1. Annual physical exam     2. BMI 25.0-25.9,adult  Patient's Body mass index is 25.05 kg/m². BMI is within normal parameters. No follow-up required.     3. Dillard's esophagus without dysplasia  omeprazole (priLOSEC) 40 MG capsule     4. Gastroesophageal reflux disease with esophagitis  Well controlled, continue: omeprazole (priLOSEC) 40 MG capsule    raNITIdine (ZANTAC) 150 MG tablet     5. Depression with anxiety  Well controlled, continue: desvenlafaxine (PRISTIQ) 50 MG 24 hr tablet     6. Psychophysiological insomnia  Well controlled, continue: traZODone (DESYREL) 100 MG tablet         2. Patient Counseling:  --Nutrition: Stressed importance of moderation in sodium/caffeine intake, saturated fat and cholesterol, caloric balance, sufficient intake of fresh fruits, vegetables, fiber, calcium, iron, and 1 g folate supplementation if of childbearing age.   --Discussed the issue of calcium supplement, and the daily use of baby aspirin if applicable.             --Mammogram recommended every 2 years from age 40-49 and yearly beginning at age 50.  --Exercise: Stressed the importance of regular exercise.   --Substance Abuse: Discussed cessation/primary prevention of tobacco (if applicable), alcohol, or other drug use (if applicable); driving or other dangerous activities under the influence; availability of treatment for abuse.    --Sexuality: Discussed sexually transmitted diseases, partner selection, use of condoms, avoidance of unintended pregnancy  and contraceptive alternatives.   --Injury prevention: Discussed safety belts, safety helmets, smoke detector, smoking near bedding or upholstery.    --Dental health: Discussed importance of regular tooth brushing, flossing, and dental visits every 6 months.  --Immunizations reviewed.  --Discussed benefits of screening colonoscopy (if applicable).  --After hours service discussed with patient    3. Discussed the patient's BMI with her.  The BMI is in the acceptable range.  4. Return in about 6 months (around 10/18/2019) for Next scheduled follow up.         ESTELLE Mcmillan  04/18/2019  8:47 AM

## 2019-05-06 DIAGNOSIS — F51.04 PSYCHOPHYSIOLOGICAL INSOMNIA: ICD-10-CM

## 2019-05-06 DIAGNOSIS — K22.70 BARRETT'S ESOPHAGUS WITHOUT DYSPLASIA: Chronic | ICD-10-CM

## 2019-05-06 DIAGNOSIS — K21.00 GASTROESOPHAGEAL REFLUX DISEASE WITH ESOPHAGITIS: ICD-10-CM

## 2019-05-06 RX ORDER — RANITIDINE 150 MG/1
150 TABLET ORAL 2 TIMES DAILY PRN
Qty: 180 TABLET | Refills: 1 | Status: SHIPPED | OUTPATIENT
Start: 2019-05-06 | End: 2020-06-08

## 2019-05-06 RX ORDER — OMEPRAZOLE 40 MG/1
40 CAPSULE, DELAYED RELEASE ORAL DAILY
Qty: 90 CAPSULE | Refills: 1 | Status: SHIPPED | OUTPATIENT
Start: 2019-05-06 | End: 2019-10-18 | Stop reason: SDUPTHER

## 2019-06-16 DIAGNOSIS — F41.8 DEPRESSION WITH ANXIETY: ICD-10-CM

## 2019-06-16 DIAGNOSIS — F51.04 PSYCHOPHYSIOLOGICAL INSOMNIA: ICD-10-CM

## 2019-06-17 RX ORDER — DESVENLAFAXINE SUCCINATE 50 MG/1
TABLET, EXTENDED RELEASE ORAL
Qty: 90 TABLET | Refills: 1 | OUTPATIENT
Start: 2019-06-17

## 2019-06-17 RX ORDER — TRAZODONE HYDROCHLORIDE 100 MG/1
TABLET ORAL
Qty: 90 TABLET | Refills: 1 | OUTPATIENT
Start: 2019-06-17

## 2019-07-30 DIAGNOSIS — F41.8 DEPRESSION WITH ANXIETY: ICD-10-CM

## 2019-07-30 RX ORDER — DESVENLAFAXINE SUCCINATE 50 MG/1
50 TABLET, EXTENDED RELEASE ORAL DAILY
Qty: 90 TABLET | Refills: 0 | Status: SHIPPED | OUTPATIENT
Start: 2019-07-30 | End: 2019-10-18 | Stop reason: SDUPTHER

## 2019-10-18 DIAGNOSIS — K22.70 BARRETT'S ESOPHAGUS WITHOUT DYSPLASIA: Chronic | ICD-10-CM

## 2019-10-18 DIAGNOSIS — K21.00 GASTROESOPHAGEAL REFLUX DISEASE WITH ESOPHAGITIS: ICD-10-CM

## 2019-10-18 DIAGNOSIS — F41.8 DEPRESSION WITH ANXIETY: ICD-10-CM

## 2019-10-18 RX ORDER — DESVENLAFAXINE SUCCINATE 50 MG/1
TABLET, EXTENDED RELEASE ORAL
Qty: 90 TABLET | Refills: 4 | Status: SHIPPED | OUTPATIENT
Start: 2019-10-18 | End: 2019-10-29 | Stop reason: SDUPTHER

## 2019-10-18 RX ORDER — OMEPRAZOLE 40 MG/1
CAPSULE, DELAYED RELEASE ORAL
Qty: 90 CAPSULE | Refills: 4 | Status: SHIPPED | OUTPATIENT
Start: 2019-10-18 | End: 2020-06-08 | Stop reason: SDUPTHER

## 2019-10-25 ENCOUNTER — OFFICE VISIT (OUTPATIENT)
Dept: GASTROENTEROLOGY | Facility: CLINIC | Age: 44
End: 2019-10-25

## 2019-10-25 VITALS
DIASTOLIC BLOOD PRESSURE: 90 MMHG | BODY MASS INDEX: 23.52 KG/M2 | WEIGHT: 137.8 LBS | TEMPERATURE: 97.5 F | HEART RATE: 95 BPM | SYSTOLIC BLOOD PRESSURE: 124 MMHG | OXYGEN SATURATION: 98 % | HEIGHT: 64 IN

## 2019-10-25 DIAGNOSIS — K22.70 BARRETT'S ESOPHAGUS WITHOUT DYSPLASIA: ICD-10-CM

## 2019-10-25 DIAGNOSIS — K21.00 GASTROESOPHAGEAL REFLUX DISEASE WITH ESOPHAGITIS: Primary | ICD-10-CM

## 2019-10-25 PROCEDURE — 99213 OFFICE O/P EST LOW 20 MIN: CPT | Performed by: INTERNAL MEDICINE

## 2019-10-25 NOTE — PROGRESS NOTES
PCP: Latasha Hadley PA    Chief Complaint   Patient presents with   • Follow-up   Acid reflux and medication.    History of Present Illness:   HPI  Mrs. Becker returns to the office for follow-up.  The patient is doing well at this time on the proton pump inhibitor once daily.  There is no history of breakthrough heartburn.  She has not needed to take an additional Zantac.  There is no history of nausea.  Mrs. Becker denies any abdominal pain with meals or postprandially.  There is no history of change in bowel habits. She denies any signs of gastrointestinal bleeding.  There is no history of unexplained weight loss.  Past Medical History:   Diagnosis Date   • Anemia    • Dillard esophagus    • Dillard's esophagus without dysplasia 4/11/2019    Continue omeprazole daily.    • Depression    • Diverticulitis of colon    • Diverticulosis    • Gastroesophageal reflux disease 9/17/2018   • GERD (gastroesophageal reflux disease)        Past Surgical History:   Procedure Laterality Date   • ENDOMETRIAL ABLATION     • TOTAL ABDOMINAL HYSTERECTOMY WITH SALPINGO OOPHORECTOMY     • UPPER GASTROINTESTINAL ENDOSCOPY           Current Outpatient Medications:   •  desvenlafaxine (PRISTIQ) 50 MG 24 hr tablet, TAKE 1 TABLET DAILY, Disp: 90 tablet, Rfl: 4  •  omeprazole (priLOSEC) 40 MG capsule, TAKE 1 CAPSULE DAILY, Disp: 90 capsule, Rfl: 4  •  raNITIdine (ZANTAC) 150 MG tablet, Take 1 tablet by mouth 2 (Two) Times a Day As Needed for Heartburn or Indigestion., Disp: 180 tablet, Rfl: 1  •  traZODone (DESYREL) 100 MG tablet, Take 1/2 to 1 tablet 30 minutes before bedtime as needed for sleep., Disp: 90 tablet, Rfl: 1    Allergies   Allergen Reactions   • Penicillins Hives       Family History   Problem Relation Age of Onset   • SHAHID disease Mother    • Hypertension Mother    • Hyperlipidemia Mother    • Osteoporosis Mother    • Migraines Sister    • Cervical cancer Maternal Aunt    • Ovarian cancer Maternal Aunt    • Diabetes  Maternal Aunt    • Diabetes Maternal Uncle    • Diabetes Paternal Aunt    • Diabetes Paternal Uncle    • Hypertension Paternal Uncle    • Heart attack Cousin        Social History     Socioeconomic History   • Marital status:      Spouse name: Not on file   • Number of children: Not on file   • Years of education: Not on file   • Highest education level: Not on file   Tobacco Use   • Smoking status: Former Smoker     Packs/day: 0.50     Last attempt to quit: 2019     Years since quittin.6   • Smokeless tobacco: Never Used   • Tobacco comment: menthol   Substance and Sexual Activity   • Alcohol use: Yes     Comment: rare   • Drug use: No   • Sexual activity: Yes     Birth control/protection: Post-menopausal, Surgical       Review of Systems   Constitutional: Negative for activity change, appetite change, fatigue, fever and unexpected weight change.   HENT: Negative for dental problem, hearing loss, mouth sores, postnasal drip, sneezing, trouble swallowing and voice change.    Eyes: Negative for pain, redness, itching and visual disturbance.   Respiratory: Negative for cough, choking, chest tightness, shortness of breath and wheezing.    Cardiovascular: Negative for chest pain, palpitations and leg swelling.   Gastrointestinal: Negative for abdominal distention, abdominal pain, anal bleeding, blood in stool, constipation, diarrhea, nausea, rectal pain and vomiting.   Endocrine: Negative for cold intolerance, heat intolerance, polydipsia, polyphagia and polyuria.   Genitourinary: Negative.  Negative for dysuria, enuresis, flank pain, hematuria and urgency.   Musculoskeletal: Negative for arthralgias, back pain, gait problem, joint swelling and myalgias.   Skin: Negative for color change, pallor and rash.   Allergic/Immunologic: Negative for environmental allergies, food allergies and immunocompromised state.   Neurological: Negative for dizziness, tremors, seizures, facial asymmetry, speech difficulty,  numbness and headaches.   Hematological: Negative for adenopathy.   Psychiatric/Behavioral: Negative for behavioral problems, confusion, dysphoric mood, hallucinations and self-injury.       Vitals:    10/25/19 0941   BP: 124/90   Pulse: 95   Temp: 97.5 °F (36.4 °C)   SpO2: 98%       Physical Exam   Constitutional: She is oriented to person, place, and time. She appears well-nourished. No distress.   HENT:   Head: Atraumatic.   Mouth/Throat: Oropharynx is clear and moist. No oropharyngeal exudate.   Eyes: EOM are normal. No scleral icterus.   Neck: Neck supple. No thyromegaly present.   Cardiovascular: Normal rate, regular rhythm and normal heart sounds. Exam reveals no gallop.   No murmur heard.  Pulmonary/Chest: Effort normal and breath sounds normal. She has no wheezes. She has no rales.   Abdominal: Soft. Bowel sounds are normal. There is no tenderness. There is no rebound and no guarding.   Musculoskeletal: Normal range of motion. She exhibits no edema.   Lymphadenopathy:     She has no cervical adenopathy.   Neurological: She is alert and oriented to person, place, and time. She exhibits normal muscle tone.   Skin: Skin is dry. No erythema.   Psychiatric: She has a normal mood and affect. Her behavior is normal. Thought content normal.   Vitals reviewed.      Phuong was seen today for follow-up.    Diagnoses and all orders for this visit:    Gastroesophageal reflux disease with esophagitis    Dillard's esophagus without dysplasia    The patient was noted on endoscopy to have a very short segment of Dillard's esophagus.  There was no dysplasia on the biopsies.  She has been maintained well on proton pump inhibitor medication daily.  She does not have additional risk factors for esophageal disease particularly no tobacco use.      Plan: Will continue the Prilosec 40 mg 1 capsule daily.           Will plan on surveillance EGD in 3 years or sooner if new issues develop.           Will follow up in the office in 1  year.

## 2019-10-29 ENCOUNTER — OFFICE VISIT (OUTPATIENT)
Dept: INTERNAL MEDICINE | Facility: CLINIC | Age: 44
End: 2019-10-29

## 2019-10-29 VITALS
HEART RATE: 92 BPM | TEMPERATURE: 98.5 F | HEIGHT: 64 IN | DIASTOLIC BLOOD PRESSURE: 86 MMHG | BODY MASS INDEX: 23.73 KG/M2 | SYSTOLIC BLOOD PRESSURE: 126 MMHG | WEIGHT: 139 LBS | OXYGEN SATURATION: 98 %

## 2019-10-29 DIAGNOSIS — Z23 NEED FOR PROPHYLACTIC VACCINATION AGAINST HEPATITIS A: ICD-10-CM

## 2019-10-29 DIAGNOSIS — F41.8 DEPRESSION WITH ANXIETY: Primary | ICD-10-CM

## 2019-10-29 DIAGNOSIS — F51.04 PSYCHOPHYSIOLOGICAL INSOMNIA: ICD-10-CM

## 2019-10-29 PROCEDURE — 90471 IMMUNIZATION ADMIN: CPT | Performed by: PHYSICIAN ASSISTANT

## 2019-10-29 PROCEDURE — 90632 HEPA VACCINE ADULT IM: CPT | Performed by: PHYSICIAN ASSISTANT

## 2019-10-29 PROCEDURE — 99213 OFFICE O/P EST LOW 20 MIN: CPT | Performed by: PHYSICIAN ASSISTANT

## 2019-10-29 RX ORDER — DESVENLAFAXINE SUCCINATE 50 MG/1
50 TABLET, EXTENDED RELEASE ORAL DAILY
Qty: 90 TABLET | Refills: 1 | Status: SHIPPED | OUTPATIENT
Start: 2019-10-29 | End: 2020-06-08 | Stop reason: SDUPTHER

## 2019-10-29 NOTE — PROGRESS NOTES
Phuong Becker is a 44 y.o. female.     Subjective   History of Present Illness   Here today for 6 month follow up of depression, anxiety and insomnia. She has been taking Pristiq 50 mg daily and feels she is doing well with it as it controls dysphoric mood and anxious tendencies well. She also has trazodone 100 mg qhs prn for insomnia but notes that she has not needed it in a long time.  No SI or HI.  No new complaints or concerns.           The following portions of the patient's history were reviewed and updated as appropriate: allergies, current medications, past family history, past medical history, past social history, past surgical history and problem list.    Review of Systems   Constitutional: Negative for appetite change, chills, fatigue, fever and unexpected weight change.   Respiratory: Negative for cough, chest tightness, shortness of breath and wheezing.    Cardiovascular: Negative for chest pain, palpitations and leg swelling.   Neurological: Negative for dizziness, tremors, seizures, syncope, weakness, light-headedness and headaches.   Hematological: Negative for adenopathy. Does not bruise/bleed easily.   Psychiatric/Behavioral: Positive for dysphoric mood (stable) and sleep disturbance (stable). Negative for agitation, behavioral problems, decreased concentration, hallucinations, self-injury and suicidal ideas. The patient is nervous/anxious (stable).          Objective    Physical Exam   Constitutional: She is oriented to person, place, and time. She appears well-developed and well-nourished. No distress.   Eyes: Conjunctivae and EOM are normal. Pupils are equal, round, and reactive to light.   Neck: Normal range of motion. Neck supple.   Cardiovascular: Normal rate, regular rhythm and normal heart sounds. Exam reveals no gallop and no friction rub.   No murmur heard.  Pulmonary/Chest: Effort normal and breath sounds normal. No respiratory distress. She has no wheezes. She has no rales.  "  Abdominal: Soft. Bowel sounds are normal. There is no tenderness.   Musculoskeletal: Normal range of motion. She exhibits no edema, tenderness or deformity.   Neurological: She is alert and oriented to person, place, and time. She displays normal reflexes. No cranial nerve deficit or sensory deficit. She exhibits normal muscle tone. Coordination normal.   Skin: Skin is warm and dry. Capillary refill takes less than 2 seconds. No rash noted. She is not diaphoretic.   Psychiatric: She has a normal mood and affect. Her behavior is normal. Judgment and thought content normal.   Nursing note and vitals reviewed.        /86   Pulse 92   Temp 98.5 °F (36.9 °C)   Ht 162.6 cm (64.02\")   Wt 63 kg (139 lb)   SpO2 98%   BMI 23.85 kg/m²     Nursing note and vitals reviewed.          Assessment/Plan   Phuong was seen today for follow-up.    Diagnoses and all orders for this visit:    Depression with anxiety  -     Well controlled, continue: desvenlafaxine (PRISTIQ) 50 MG 24 hr tablet; Take 1 tablet by mouth Daily.    Psychophysiological insomnia  Well controlled currently without medication. May use trazodone qhs prn if symptoms return.     Need for prophylactic vaccination against hepatitis A  -     Hepatitis A Vaccine Adult IM      Return in 6 months for annual physical or sooner if needed.              "

## 2020-06-08 ENCOUNTER — OFFICE VISIT (OUTPATIENT)
Dept: INTERNAL MEDICINE | Facility: CLINIC | Age: 45
End: 2020-06-08

## 2020-06-08 VITALS
HEIGHT: 64 IN | WEIGHT: 154 LBS | OXYGEN SATURATION: 98 % | HEART RATE: 98 BPM | DIASTOLIC BLOOD PRESSURE: 80 MMHG | SYSTOLIC BLOOD PRESSURE: 118 MMHG | BODY MASS INDEX: 26.29 KG/M2 | TEMPERATURE: 97.8 F

## 2020-06-08 DIAGNOSIS — G56.02 CARPAL TUNNEL SYNDROME OF LEFT WRIST: ICD-10-CM

## 2020-06-08 DIAGNOSIS — F51.04 PSYCHOPHYSIOLOGICAL INSOMNIA: ICD-10-CM

## 2020-06-08 DIAGNOSIS — K21.00 GASTROESOPHAGEAL REFLUX DISEASE WITH ESOPHAGITIS: ICD-10-CM

## 2020-06-08 DIAGNOSIS — Z11.59 NEED FOR HEPATITIS C SCREENING TEST: ICD-10-CM

## 2020-06-08 DIAGNOSIS — K22.70 BARRETT'S ESOPHAGUS WITHOUT DYSPLASIA: Chronic | ICD-10-CM

## 2020-06-08 DIAGNOSIS — Z00.00 ANNUAL PHYSICAL EXAM: Primary | ICD-10-CM

## 2020-06-08 DIAGNOSIS — Z00.00 PREVENTATIVE HEALTH CARE: ICD-10-CM

## 2020-06-08 DIAGNOSIS — F41.8 DEPRESSION WITH ANXIETY: ICD-10-CM

## 2020-06-08 PROCEDURE — 99396 PREV VISIT EST AGE 40-64: CPT | Performed by: PHYSICIAN ASSISTANT

## 2020-06-08 RX ORDER — DESVENLAFAXINE SUCCINATE 50 MG/1
50 TABLET, EXTENDED RELEASE ORAL DAILY
Qty: 90 TABLET | Refills: 1 | Status: SHIPPED | OUTPATIENT
Start: 2020-06-08 | End: 2020-12-24

## 2020-06-08 RX ORDER — OMEPRAZOLE 40 MG/1
40 CAPSULE, DELAYED RELEASE ORAL DAILY
Qty: 90 CAPSULE | Refills: 1 | Status: SHIPPED | OUTPATIENT
Start: 2020-06-08 | End: 2020-12-24

## 2020-06-08 RX ORDER — TRAZODONE HYDROCHLORIDE 100 MG/1
TABLET ORAL
Qty: 90 TABLET | Refills: 1 | Status: SHIPPED | OUTPATIENT
Start: 2020-06-08 | End: 2021-04-07 | Stop reason: SDUPTHER

## 2020-06-08 NOTE — PROGRESS NOTES
Subjective   Phuong Becker is a 44 y.o. female and is here for a comprehensive physical exam. The patient reports no problems.    HPI: Pristiq is working well.     She is taking omeprazole as directed which manages symptoms well.     2 weeks left 2nd-3rd fingers ache, worse in morning. Occasionally the left wrist aches. No numbness or tingling. No known injury.           Health Habits:  Eye exam within last 2 years? No, recommended she have eye exam.   Dental exam every 6 months? yes  Exercise habits: 5+ days per week  Healthy diet? Attempting, needs improvement    The 10-year ASCVD risk score (Jennifer STAPLES Jr., et al., 2013) is: 0.5%    Values used to calculate the score:      Age: 44 years      Sex: Female      Is Non- : No      Diabetic: No      Tobacco smoker: No      Systolic Blood Pressure: 118 mmHg      Is BP treated: No      HDL Cholesterol: 51 mg/dL      Total Cholesterol: 168 mg/dL        Do you take any herbs or supplements that were not prescribed by a doctor? yes, multivitamin and calcium with vitamin d  Are you taking calcium supplements? No  Are you taking aspirin daily? No     History:  LMP: No LMP recorded.  Menopause: Yes  Last pap date: , no longer indicated  Abnormal pap? no  Family history of breast or ovarian cancer: yes, ovarian CA in a maternal aunt         OB History    Para Term  AB Living   4 3 3   1 3   SAB TAB Ectopic Molar Multiple Live Births       1     3      # Outcome Date GA Lbr Louis/2nd Weight Sex Delivery Anes PTL Lv   4 Term            3 Term            2 Term            1 Ectopic               reports that she currently engages in sexual activity. She reports using the following methods of birth control/protection: Post-menopausal and Surgical.        The following portions of the patient's history were reviewed and updated as appropriate: She  has a past medical history of Anemia, Dillard esophagus, Dillard's esophagus without  dysplasia (4/11/2019), Depression, Diverticulitis of colon, Diverticulosis, Gastroesophageal reflux disease (9/17/2018), and GERD (gastroesophageal reflux disease).  She does not have any pertinent problems on file.  She  has a past surgical history that includes Endometrial ablation; Total abdominal hysterectomy w/ bilateral salpingoophorectomy; and Upper gastrointestinal endoscopy.  Her family history includes Cervical cancer in her maternal aunt; Diabetes in her maternal aunt, maternal uncle, paternal aunt, and paternal uncle; SHAHID disease in her mother; Heart attack in her cousin; Hyperlipidemia in her mother; Hypertension in her mother and paternal uncle; Migraines in her sister; Osteoporosis in her mother; Ovarian cancer in her maternal aunt.  She  reports that she quit smoking about 15 months ago. She smoked 0.50 packs per day. She has never used smokeless tobacco. She reports that she drinks alcohol. She reports that she does not use drugs.  Current Outpatient Medications   Medication Sig Dispense Refill   • Calcium-Vitamins C & D (CALCIUM/C/D PO) Take  by mouth.     • Multiple Vitamins-Minerals (MULTIVITAL PO) Take  by mouth.     • desvenlafaxine (PRISTIQ) 50 MG 24 hr tablet Take 1 tablet by mouth Daily. 90 tablet 1   • omeprazole (priLOSEC) 40 MG capsule Take 1 capsule by mouth Daily. 90 capsule 1   • traZODone (DESYREL) 100 MG tablet Take 1/2 to 1 tablet 30 minutes before bedtime as needed for sleep. 90 tablet 1     No current facility-administered medications for this visit.        Review of Systems  Do you have pain that bothers you in your daily life? yes, fingers    Review of Systems   Constitutional: Negative for activity change, appetite change, chills, diaphoresis, fatigue, fever, unexpected weight gain and unexpected weight loss.   HENT: Negative for congestion, dental problem, ear pain, mouth sores, postnasal drip, rhinorrhea, sinus pressure, sneezing, sore throat, swollen glands, trouble  "swallowing and voice change.    Eyes: Negative for blurred vision, double vision, pain, discharge, redness, itching and visual disturbance.   Respiratory: Negative for cough, choking, chest tightness, shortness of breath and wheezing.    Cardiovascular: Negative for chest pain, palpitations and leg swelling.   Gastrointestinal: Positive for GERD. Negative for abdominal distention, abdominal pain, blood in stool, constipation, diarrhea, nausea, rectal pain, vomiting and indigestion.   Endocrine: Negative for cold intolerance, heat intolerance, polydipsia, polyphagia and polyuria.   Genitourinary: Negative for breast discharge, breast lump, breast pain, decreased libido, decreased urine volume, difficulty urinating, dysuria, flank pain, frequency, hematuria, pelvic pain, urgency, vaginal bleeding and vaginal pain.   Musculoskeletal: Positive for arthralgias. Negative for back pain, gait problem, myalgias and neck pain.   Skin: Negative for color change, rash and skin lesions.   Allergic/Immunologic: Negative for environmental allergies and immunocompromised state.   Neurological: Negative for dizziness, tremors, facial asymmetry, speech difficulty, weakness, light-headedness, numbness, headache, memory problem and confusion.   Hematological: Negative for adenopathy. Does not bruise/bleed easily.   Psychiatric/Behavioral: Negative for agitation, behavioral problems, decreased concentration, hallucinations, self-injury, sleep disturbance, suicidal ideas, negative for hyperactivity, depressed mood and stress. The patient is not nervous/anxious.          Objective   /80   Pulse 98   Temp 97.8 °F (36.6 °C)   Ht 162.6 cm (64.02\")   Wt 69.9 kg (154 lb)   SpO2 98%   BMI 26.42 kg/m²     Physical Exam   Constitutional: She is oriented to person, place, and time. She appears well-developed and well-nourished. No distress.   HENT:   Head: Normocephalic and atraumatic.   Right Ear: External ear normal.   Left Ear: " External ear normal.   Nose: Nose normal.   Mouth/Throat: Oropharynx is clear and moist. No oropharyngeal exudate.   Eyes: Pupils are equal, round, and reactive to light. Conjunctivae and EOM are normal. No scleral icterus.   Neck: Normal range of motion. Neck supple. No thyromegaly present.   Cardiovascular: Normal rate, regular rhythm and normal heart sounds. Exam reveals no gallop and no friction rub.   No murmur heard.  Pulmonary/Chest: Effort normal and breath sounds normal. No respiratory distress. She has no wheezes. She has no rales. She exhibits no tenderness.   Abdominal: Soft. Bowel sounds are normal. She exhibits no distension and no mass. There is no tenderness. There is no rebound.   Musculoskeletal: Normal range of motion. She exhibits no edema, tenderness or deformity.   Positive Belsano test left wrist, negative Tinnel test left wrist.    Lymphadenopathy:     She has no cervical adenopathy.   Neurological: She is alert and oriented to person, place, and time. She displays normal reflexes. No cranial nerve deficit or sensory deficit.   Skin: Skin is warm and dry. Capillary refill takes less than 2 seconds. No rash noted. She is not diaphoretic. No pallor.   Psychiatric: She has a normal mood and affect. Her behavior is normal. Judgment and thought content normal.   Nursing note and vitals reviewed.         Assessment/Plan   Healthy female exam.     1.    Diagnosis Plan   1. Annual physical exam     2. BMI 26.0-26.9,adult  Patient's Body mass index is 26.42 kg/m². BMI is above normal parameters. Recommendations include: exercise counseling and nutrition counseling.     3. Dillard's esophagus without dysplasia  Continue: omeprazole (priLOSEC) 40 MG capsule   4. Gastroesophageal reflux disease with esophagitis  Continue: omeprazole (priLOSEC) 40 MG capsule     5. Depression with anxiety  Well controlled, continue: desvenlafaxine (PRISTIQ) 50 MG 24 hr tablet     6. Psychophysiological insomnia  Continue  prn: traZODone (DESYREL) 100 MG tablet   7. Carpal tunnel syndrome of left wrist  Discussed and provided written information. Wrist brace at bedtime encouraged.      8. Preventative health care  Lipid Panel    Comprehensive Metabolic Panel    Hepatitis C Antibody     9. Need for hepatitis C screening test  Hepatitis C Antibody         2. Patient Counseling:  --Nutrition: Stressed importance of moderation in sodium/caffeine intake, saturated fat and cholesterol, caloric balance, sufficient intake of fresh fruits, vegetables, fiber, calcium, iron.  --Discussed the issue of calcium supplement, and the daily use of baby aspirin if applicable.             --Mammogram recommended every 2 years from age 40-49 and yearly beginning at age 50. Mammogram order declined today.   --Exercise: Stressed the importance of regular exercise.   --Substance Abuse: Discussed cessation/primary prevention of tobacco (if applicable), alcohol, or other drug use (if applicable); driving or other dangerous activities under the influence; availability of treatment for abuse.    --Sexuality: Discussed sexually transmitted diseases, partner selection, use of condoms, avoidance of unintended pregnancy  and contraceptive alternatives.   --Injury prevention: Discussed safety belts, safety helmets, smoke detector, smoking near bedding or upholstery.   --Dental health: Discussed importance of regular tooth brushing, flossing, and dental visits every 6 months.  --Immunizations reviewed.  --Discussed benefits of screening colonoscopy (if applicable).  --After hours service discussed with patient.    3. Discussed the patient's BMI with her.  The BMI is above average; BMI management plan is completed  4. Return in about 1 year (around 6/8/2021) for Annual physical.         ESTELLE Mcmillan  06/08/2020  1:10 PM

## 2020-06-08 NOTE — PATIENT INSTRUCTIONS
Carpal Tunnel Syndrome    Carpal tunnel syndrome is a condition that causes pain in your hand and arm. The carpal tunnel is a narrow area located on the palm side of your wrist. Repeated wrist motion or certain diseases may cause swelling within the tunnel. This swelling pinches the main nerve in the wrist (median nerve).  What are the causes?  This condition may be caused by:  · Repeated wrist motions.  · Wrist injuries.  · Arthritis.  · A cyst or tumor in the carpal tunnel.  · Fluid buildup during pregnancy.  Sometimes the cause of this condition is not known.  What increases the risk?  The following factors may make you more likely to develop this condition:  · Having a job, such as being a  or a , that requires you to repeatedly move your wrist in the same motion.  · Being a woman.  · Having certain conditions, such as:  ? Diabetes.  ? Obesity.  ? An underactive thyroid (hypothyroidism).  ? Kidney failure.  What are the signs or symptoms?  Symptoms of this condition include:  · A tingling feeling in your fingers, especially in your thumb, index, and middle fingers.  · Tingling or numbness in your hand.  · An aching feeling in your entire arm, especially when your wrist and elbow are bent for a long time.  · Wrist pain that goes up your arm to your shoulder.  · Pain that goes down into your palm or fingers.  · A weak feeling in your hands. You may have trouble grabbing and holding items.  Your symptoms may feel worse during the night.  How is this diagnosed?  This condition is diagnosed with a medical history and physical exam. You may also have tests, including:  · Electromyogram (EMG). This test measures electrical signals sent by your nerves into the muscles.  · Nerve conduction study. This test measures how well electrical signals pass through your nerves.  · Imaging tests, such as X-rays, ultrasound, and MRI. These tests check for possible causes of your condition.  How is this treated?  This  condition may be treated with:  · Lifestyle changes. It is important to stop or change the activity that caused your condition.  · Doing exercise and activities to strengthen your muscles and bones (physical therapy).  · Learning how to use your hand again after diagnosis (occupational therapy).  · Medicines for pain and inflammation. This may include medicine that is injected into your wrist.  · A wrist splint.  · Surgery.  Follow these instructions at home:  If you have a splint:  · Wear the splint as told by your health care provider. Remove it only as told by your health care provider.  · Loosen the splint if your fingers tingle, become numb, or turn cold and blue.  · Keep the splint clean.  · If the splint is not waterproof:  ? Do not let it get wet.  ? Cover it with a watertight covering when you take a bath or shower.  Managing pain, stiffness, and swelling    · If directed, put ice on the painful area:  ? If you have a removable splint, remove it as told by your health care provider.  ? Put ice in a plastic bag.  ? Place a towel between your skin and the bag.  ? Leave the ice on for 20 minutes, 2-3 times per day.  General instructions  · Take over-the-counter and prescription medicines only as told by your health care provider.  · Rest your wrist from any activity that may be causing your pain. If your condition is work related, talk with your employer about changes that can be made, such as getting a wrist pad to use while typing.  · Do any exercises as told by your health care provider, physical therapist, or occupational therapist.  · Keep all follow-up visits as told by your health care provider. This is important.  Contact a health care provider if:  · You have new symptoms.  · Your pain is not controlled with medicines.  · Your symptoms get worse.  Get help right away if:  · You have severe numbness or tingling in your wrist or hand.  Summary  · Carpal tunnel syndrome is a condition that causes pain in  your hand and arm.  · It is usually caused by repeated wrist motions.  · Lifestyle changes and medicines are used to treat carpal tunnel syndrome. Surgery may be recommended.  · Follow your health care provider's instructions about wearing a splint, resting from activity, keeping follow-up visits, and calling for help.  This information is not intended to replace advice given to you by your health care provider. Make sure you discuss any questions you have with your health care provider.  Document Released: 12/15/2001 Document Revised: 04/26/2019 Document Reviewed: 04/26/2019  Elsevier Patient Education © 2020 [employer]

## 2020-07-20 RX ORDER — VALACYCLOVIR HYDROCHLORIDE 1 G/1
1000 TABLET, FILM COATED ORAL 3 TIMES DAILY
Qty: 21 TABLET | Refills: 0 | Status: SHIPPED | OUTPATIENT
Start: 2020-07-20 | End: 2020-07-27

## 2020-12-23 DIAGNOSIS — K21.00 GASTROESOPHAGEAL REFLUX DISEASE WITH ESOPHAGITIS: ICD-10-CM

## 2020-12-23 DIAGNOSIS — F41.8 DEPRESSION WITH ANXIETY: ICD-10-CM

## 2020-12-23 DIAGNOSIS — K22.70 BARRETT'S ESOPHAGUS WITHOUT DYSPLASIA: Chronic | ICD-10-CM

## 2020-12-24 RX ORDER — OMEPRAZOLE 40 MG/1
CAPSULE, DELAYED RELEASE ORAL
Qty: 90 CAPSULE | Refills: 3 | Status: SHIPPED | OUTPATIENT
Start: 2020-12-24 | End: 2021-07-19 | Stop reason: SDUPTHER

## 2020-12-24 RX ORDER — DESVENLAFAXINE SUCCINATE 50 MG/1
TABLET, EXTENDED RELEASE ORAL
Qty: 90 TABLET | Refills: 3 | Status: SHIPPED | OUTPATIENT
Start: 2020-12-24 | End: 2021-12-02

## 2021-04-07 DIAGNOSIS — F51.04 PSYCHOPHYSIOLOGICAL INSOMNIA: ICD-10-CM

## 2021-04-07 RX ORDER — TRAZODONE HYDROCHLORIDE 100 MG/1
TABLET ORAL
Qty: 90 TABLET | Refills: 0 | Status: SHIPPED | OUTPATIENT
Start: 2021-04-07 | End: 2021-06-11 | Stop reason: SDUPTHER

## 2021-06-11 ENCOUNTER — OFFICE VISIT (OUTPATIENT)
Dept: INTERNAL MEDICINE | Facility: CLINIC | Age: 46
End: 2021-06-11

## 2021-06-11 VITALS
WEIGHT: 148 LBS | SYSTOLIC BLOOD PRESSURE: 124 MMHG | TEMPERATURE: 97.2 F | HEIGHT: 64 IN | OXYGEN SATURATION: 99 % | DIASTOLIC BLOOD PRESSURE: 92 MMHG | BODY MASS INDEX: 25.27 KG/M2 | HEART RATE: 93 BPM

## 2021-06-11 DIAGNOSIS — E87.6 HYPOKALEMIA: ICD-10-CM

## 2021-06-11 DIAGNOSIS — Z11.59 NEED FOR HEPATITIS C SCREENING TEST: ICD-10-CM

## 2021-06-11 DIAGNOSIS — E66.3 OVERWEIGHT WITH BODY MASS INDEX (BMI) OF 25 TO 25.9 IN ADULT: ICD-10-CM

## 2021-06-11 DIAGNOSIS — Z91.09 ENVIRONMENTAL ALLERGIES: ICD-10-CM

## 2021-06-11 DIAGNOSIS — Z00.00 PREVENTATIVE HEALTH CARE: ICD-10-CM

## 2021-06-11 DIAGNOSIS — F51.04 PSYCHOPHYSIOLOGICAL INSOMNIA: ICD-10-CM

## 2021-06-11 DIAGNOSIS — R73.01 ELEVATED FASTING GLUCOSE: ICD-10-CM

## 2021-06-11 DIAGNOSIS — K22.70 BARRETT'S ESOPHAGUS WITHOUT DYSPLASIA: ICD-10-CM

## 2021-06-11 DIAGNOSIS — Z00.00 ANNUAL PHYSICAL EXAM: Primary | ICD-10-CM

## 2021-06-11 DIAGNOSIS — Z23 NEED FOR TDAP VACCINATION: ICD-10-CM

## 2021-06-11 PROCEDURE — 99396 PREV VISIT EST AGE 40-64: CPT | Performed by: PHYSICIAN ASSISTANT

## 2021-06-11 PROCEDURE — 90715 TDAP VACCINE 7 YRS/> IM: CPT | Performed by: PHYSICIAN ASSISTANT

## 2021-06-11 PROCEDURE — 90471 IMMUNIZATION ADMIN: CPT | Performed by: PHYSICIAN ASSISTANT

## 2021-06-11 RX ORDER — TRAZODONE HYDROCHLORIDE 100 MG/1
TABLET ORAL
Qty: 90 TABLET | Refills: 3 | Status: SHIPPED | OUTPATIENT
Start: 2021-06-11 | End: 2021-07-21 | Stop reason: SDUPTHER

## 2021-06-11 RX ORDER — FLUTICASONE PROPIONATE 50 MCG
2 SPRAY, SUSPENSION (ML) NASAL DAILY
Qty: 18.2 ML | Refills: 11 | Status: SHIPPED | OUTPATIENT
Start: 2021-06-11 | End: 2021-07-21 | Stop reason: SDUPTHER

## 2021-06-11 NOTE — PROGRESS NOTES
Female Physical Note      Patient Name: Phuong Becker  : 1975   MRN: [id number]     Chief Complaint:    Chief Complaint   Patient presents with   • Annual Exam       History of Present Illness: Phuong Becker is a 45 y.o. female who is here today for their annual health maintenance and physical.     She continues to use Trazodone for sleep.  Pristiq is working well for depression and anxiety.    She continues to take omeprazole 40 mg daily for Dillard's esophagus.     No new concerns today.     Declines mammogram at this time.        Subjective         Past Medical History, Social History, Family History and Care Team were all reviewed with patient and updated as appropriate.         Current Outpatient Medications:   •  desvenlafaxine (PRISTIQ) 50 MG 24 hr tablet, TAKE 1 TABLET DAILY, Disp: 90 tablet, Rfl: 3  •  fluticasone (Flonase) 50 MCG/ACT nasal spray, 2 sprays into the nostril(s) as directed by provider Daily., Disp: 18.2 mL, Rfl: 11  •  omeprazole (priLOSEC) 40 MG capsule, TAKE 1 CAPSULE DAILY, Disp: 90 capsule, Rfl: 3  •  traZODone (DESYREL) 100 MG tablet, Take 1/2 to 1 tablet 30 minutes before bedtime as needed for sleep., Disp: 90 tablet, Rfl: 3      Allergies   Allergen Reactions   • Penicillins Hives       Depression: PHQ-2 Depression Screening  Little interest or pleasure in doing things? 0   Feeling down, depressed, or hopeless? 0   PHQ-2 Total Score 0     The 10-year ASCVD risk score (Jenniferjos STAPLES Jr., et al., 2013) is: 0.5%    Values used to calculate the score:      Age: 45 years      Sex: Female      Is Non- : No      Diabetic: No      Tobacco smoker: No      Systolic Blood Pressure: 124 mmHg      Is BP treated: No      HDL Cholesterol: 51 mg/dL      Total Cholesterol: 168 mg/dL    Exercise and diet: active with children, good diet.    Health Maintenance:   Health Maintenance Summary          Ordered - HEPATITIS C SCREENING (Once) Ordered on 2021     "No completion, postpone, or frequency change history exists for this topic.          Ordered - TDAP/TD VACCINES (2 - Td or Tdap) Ordered on 6/11/2021 08/01/2010  Imm Admin: Tdap          Overdue - ANNUAL PHYSICAL (Yearly) Overdue since 6/9/2021 06/08/2020  Registry Metric: Last Annual Physical          Postponed - MAMMOGRAM (Every 2 Years) Postponed until 6/11/2022 06/11/2021  Postponed until 6/11/2022 by Latasha Hadley PA (Patient Refused)    06/08/2020  Postponed until 6/8/2021 by Latasha Hadley PA (Patient Refused)          INFLUENZA VACCINE (Yearly - August to March) Next due on 8/1/2021 11/05/2019  Imm Admin: [place] =>18yrs    10/18/2018  Postponed until 10/1/2019 by Latasha Hadley PA (Patient Refused)          COVID-19 Vaccine (Series Information) Completed    05/13/2021  Imm Admin: COVID-19 (PFIZER)    04/05/2021  Imm Admin: COVID-19 (PFIZER)          Pneumococcal Vaccine 0-64 (Series Information) Aged Out    No completion, postpone, or frequency change history exists for this topic.          Discontinued - PAP SMEAR  Discontinued    04/18/2019  Frequency changed to Never by Latasha Hadley PA (total hysterectomy March 2017)    11/30/2016  Done - wnl               Osteoporosis screening:   • Menopausal women < 65 with RF (advancing age, previous fracture, glucocorticoid therapy, parental hip fracture, low body weight, current cigarette smoking, excessive alcohol consumption, rheumatoid arthritis, secondary osteoporosis [hypogonadism/premature menopause, malabsorption, chronic liver disease, IBD]).  • All women 65 or older.      Objective     Physical Exam:  Vital Signs:   Vitals:    06/11/21 0948   BP: 124/92   Pulse: 93   Temp: 97.2 °F (36.2 °C)   SpO2: 99%   Weight: 67.1 kg (148 lb)   Height: 162.6 cm (64.02\")     Body mass index is 25.39 kg/m².     Physical Exam  Vitals and nursing note reviewed.   Constitutional:       General: She is not in acute " distress.     Appearance: Normal appearance. She is well-developed and overweight. She is not ill-appearing, toxic-appearing or diaphoretic.   HENT:      Head: Normocephalic and atraumatic.      Right Ear: Ear canal and external ear normal. There is no impacted cerumen.      Left Ear: Ear canal and external ear normal. There is no impacted cerumen.      Ears:      Comments: Bilateral TM effusions, left worse than right.   Eyes:      General: No scleral icterus.     Conjunctiva/sclera: Conjunctivae normal.      Pupils: Pupils are equal, round, and reactive to light.   Neck:      Thyroid: No thyromegaly.   Cardiovascular:      Rate and Rhythm: Normal rate and regular rhythm.      Heart sounds: Normal heart sounds. No murmur heard.   No friction rub. No gallop.    Pulmonary:      Effort: Pulmonary effort is normal. No respiratory distress.      Breath sounds: Normal breath sounds. No wheezing or rales.   Chest:      Chest wall: No tenderness.   Abdominal:      General: Bowel sounds are normal. There is no distension.      Palpations: Abdomen is soft. There is no mass.      Tenderness: There is no abdominal tenderness. There is no right CVA tenderness, left CVA tenderness, guarding or rebound.   Musculoskeletal:         General: No tenderness or deformity. Normal range of motion.      Cervical back: Normal range of motion and neck supple. No rigidity or tenderness.      Right lower leg: No edema.      Left lower leg: No edema.   Lymphadenopathy:      Cervical: No cervical adenopathy.   Skin:     General: Skin is warm and dry.      Capillary Refill: Capillary refill takes less than 2 seconds.      Coloration: Skin is not jaundiced or pale.      Findings: No erythema or rash.   Neurological:      General: No focal deficit present.      Mental Status: She is alert and oriented to person, place, and time.      Cranial Nerves: No cranial nerve deficit.      Sensory: No sensory deficit.      Coordination: Coordination normal.       Gait: Gait normal.      Deep Tendon Reflexes: Reflexes normal.   Psychiatric:         Mood and Affect: Mood normal.         Behavior: Behavior normal.         Thought Content: Thought content normal.         Judgment: Judgment normal.         Procedures    Assessment / Plan      Assessment/Plan:   Diagnoses and all orders for this visit:    1. Annual physical exam (Primary)  -     Lipid Panel  -     Comprehensive Metabolic Panel    2. Overweight with body mass index (BMI) of 25 to 25.9 in adult  Patient's Body mass index is 25.39 kg/m². indicating that she is overweight (BMI 25-29.9). Overweight-related health conditions include the following: GERD. BMI is is above average; BMI management plan is completed. We discussed diet information added to AVS..    3. Psychophysiological insomnia  -     Stable, continue: traZODone (DESYREL) 100 MG tablet; Take 1/2 to 1 tablet 30 minutes before bedtime as needed for sleep.  Dispense: 90 tablet; Refill: 3    4. Need for Tdap vaccination  -     Tdap Vaccine Greater Than or Equal To 6yo IM    5. Preventative health care  -     Lipid Panel  -     Comprehensive Metabolic Panel    6. Need for hepatitis C screening test  -     Hepatitis C Antibody    7. Environmental allergies  -     Begin: Fluticasone (Flonase) 50 MCG/ACT nasal spray; 2 sprays into the nostril(s) as directed by provider Daily.  Dispense: 18.2 mL; Refill: 11  Continue Zyrtec daily.    8. Dillard's esophagus without dysplasia  -     Ambulatory Referral to Gastroenterology         Follow Up:   Return in about 1 year (around 6/11/2022) for Annual physical.    Healthcare Maintenance:   --Nutrition: Stressed importance of moderation in sodium/caffeine intake, saturated fat and cholesterol, caloric balance, sufficient intake of fresh fruits, vegetables, fiber, calcium, iron, and 1 g folate supplementation if of childbearing age.   --Discussed the issue of calcium supplement, and the daily use of baby aspirin if  applicable.             --Mammogram recommended every 2 years from age 40-49 and yearly beginning at age 50.  --Exercise: Stressed the importance of regular exercise.   --Substance Abuse: Discussed cessation/primary prevention of tobacco (if applicable), alcohol, or other drug use (if applicable); driving or other dangerous activities under the influence; availability of treatment for abuse.    --Sexuality: Discussed sexually transmitted diseases, partner selection, use of condoms, avoidance of unintended pregnancy  and contraceptive alternatives.   --Injury prevention: Discussed safety belts, safety helmets, smoke detector, smoking near bedding or upholstery.   --Dental health: Discussed importance of regular tooth brushing, flossing, and dental visits every 6 months.  --Immunizations reviewed.  --Discussed benefits of screening colonoscopy (if applicable).  --After hours service discussed with patient.    Phuong Becker voices understanding and acceptance of this advice and will call back with any further questions or concerns. AVS with preventive healthcare tips printed for patient.     Latasha Hadley PA-C  [facility]

## 2021-06-12 LAB
ALBUMIN SERPL-MCNC: 4.6 G/DL (ref 3.5–5.2)
ALBUMIN/GLOB SERPL: 1.9 G/DL
ALP SERPL-CCNC: 122 U/L (ref 39–117)
ALT SERPL-CCNC: 19 U/L (ref 1–33)
AST SERPL-CCNC: 16 U/L (ref 1–32)
BILIRUB SERPL-MCNC: 0.4 MG/DL (ref 0–1.2)
BUN SERPL-MCNC: 11 MG/DL (ref 6–20)
BUN/CREAT SERPL: 12 (ref 7–25)
CALCIUM SERPL-MCNC: 9.2 MG/DL (ref 8.6–10.5)
CHLORIDE SERPL-SCNC: 102 MMOL/L (ref 98–107)
CHOLEST SERPL-MCNC: 196 MG/DL (ref 0–200)
CO2 SERPL-SCNC: 28.2 MMOL/L (ref 22–29)
CREAT SERPL-MCNC: 0.92 MG/DL (ref 0.57–1)
GLOBULIN SER CALC-MCNC: 2.4 GM/DL
GLUCOSE SERPL-MCNC: 104 MG/DL (ref 65–99)
HCV AB S/CO SERPL IA: <0.1 S/CO RATIO (ref 0–0.9)
HDLC SERPL-MCNC: 45 MG/DL (ref 40–60)
LDLC SERPL CALC-MCNC: 128 MG/DL (ref 0–100)
POTASSIUM SERPL-SCNC: 3.3 MMOL/L (ref 3.5–5.2)
PROT SERPL-MCNC: 7 G/DL (ref 6–8.5)
SODIUM SERPL-SCNC: 142 MMOL/L (ref 136–145)
TRIGL SERPL-MCNC: 127 MG/DL (ref 0–150)
VLDLC SERPL CALC-MCNC: 23 MG/DL (ref 5–40)

## 2021-07-19 ENCOUNTER — OFFICE VISIT (OUTPATIENT)
Dept: GASTROENTEROLOGY | Facility: CLINIC | Age: 46
End: 2021-07-19

## 2021-07-19 VITALS
SYSTOLIC BLOOD PRESSURE: 126 MMHG | HEART RATE: 82 BPM | WEIGHT: 149.2 LBS | TEMPERATURE: 98.1 F | BODY MASS INDEX: 25.47 KG/M2 | HEIGHT: 64 IN | DIASTOLIC BLOOD PRESSURE: 88 MMHG | OXYGEN SATURATION: 97 %

## 2021-07-19 DIAGNOSIS — K22.70 BARRETT'S ESOPHAGUS WITHOUT DYSPLASIA: ICD-10-CM

## 2021-07-19 DIAGNOSIS — K21.00 GASTROESOPHAGEAL REFLUX DISEASE WITH ESOPHAGITIS WITHOUT HEMORRHAGE: Primary | ICD-10-CM

## 2021-07-19 PROCEDURE — 99214 OFFICE O/P EST MOD 30 MIN: CPT | Performed by: NURSE PRACTITIONER

## 2021-07-19 RX ORDER — OMEPRAZOLE 40 MG/1
40 CAPSULE, DELAYED RELEASE ORAL 2 TIMES DAILY
Qty: 180 CAPSULE | Refills: 3 | Status: SHIPPED | OUTPATIENT
Start: 2021-07-19 | End: 2022-06-27

## 2021-07-19 NOTE — PROGRESS NOTES
Follow Up      Patient Name: Phuong Becker  : 1975   MRN: [id number]     Chief Complaint:    Chief Complaint   Patient presents with   • Heartburn     HX OF ROJAS'S       History of Present Illness: Phuong Becker is a 45 y.o. female who is here today for follow up on GERD and Rojas's esophagus.  Renetta been doing well since her last visit.  She has compliant with her omeprazole without any side effects.  She does report occasional breakthrough GERD that will wake her up at night while she is sleeping.  Renetta typically takes her omeprazole at 11 AM and eats around 5 PM.  She eats about 1 meal daily.  There is no dysphagia or unintentional weight loss.  She denies any blood in her stool or change in appetite.  There is no abdominal pain.    ENDOSCOPY, INT (2019)    Subjective      Review of Systems:   Review of Systems   Constitutional: Negative for appetite change and unexpected weight loss.   HENT: Negative for trouble swallowing.    Gastrointestinal: Positive for GERD. Negative for abdominal distention, abdominal pain, anal bleeding, blood in stool, constipation, diarrhea, nausea, rectal pain, vomiting and indigestion.       Medications:     Current Outpatient Medications:   •  desvenlafaxine (PRISTIQ) 50 MG 24 hr tablet, TAKE 1 TABLET DAILY, Disp: 90 tablet, Rfl: 3  •  fluticasone (Flonase) 50 MCG/ACT nasal spray, 2 sprays into the nostril(s) as directed by provider Daily., Disp: 18.2 mL, Rfl: 11  •  omeprazole (priLOSEC) 40 MG capsule, Take 1 capsule by mouth 2 (Two) Times a Day., Disp: 180 capsule, Rfl: 3  •  traZODone (DESYREL) 100 MG tablet, Take 1/2 to 1 tablet 30 minutes before bedtime as needed for sleep., Disp: 90 tablet, Rfl: 3    Allergies:   Allergies   Allergen Reactions   • Penicillins Hives       Social History:   Social History     Socioeconomic History   • Marital status:      Spouse name: Not on file   • Number of children: Not on file   • Years of  "education: Not on file   • Highest education level: Not on file   Tobacco Use   • Smoking status: Former Smoker     Packs/day: 0.50     Years: 25.00     Pack years: 12.50     Types: Cigarettes     Quit date: 2019     Years since quittin.4   • Smokeless tobacco: Never Used   • Tobacco comment: menthol   Vaping Use   • Vaping Use: Never used   Substance and Sexual Activity   • Alcohol use: Yes     Comment: rare   • Drug use: No   • Sexual activity: Yes     Birth control/protection: Post-menopausal, Surgical        Surgical History:   Past Surgical History:   Procedure Laterality Date   • ENDOMETRIAL ABLATION     • TOTAL ABDOMINAL HYSTERECTOMY WITH SALPINGO OOPHORECTOMY     • UPPER GASTROINTESTINAL ENDOSCOPY          Medical History:   Past Medical History:   Diagnosis Date   • Anemia    • Dillard esophagus    • Dillard's esophagus without dysplasia 2019    Continue omeprazole daily.    • Depression    • Diverticulitis of colon    • Diverticulosis    • Gastroesophageal reflux disease 2018   • GERD (gastroesophageal reflux disease)         Objective     Physical Exam:  Vital Signs:   Vitals:    21 1312   BP: 126/88   BP Location: Left arm   Patient Position: Sitting   Cuff Size: Adult   Pulse: 82   Temp: 98.1 °F (36.7 °C)   TempSrc: Temporal   SpO2: 97%   Weight: 67.7 kg (149 lb 3.2 oz)   Height: 162.6 cm (64.02\")     Body mass index is 25.6 kg/m².     Physical Exam  Vitals and nursing note reviewed.   Constitutional:       General: She is not in acute distress.     Appearance: She is well-developed. She is not diaphoretic.   Eyes:      General: No scleral icterus.     Extraocular Movements:      Right eye: No nystagmus.      Left eye: No nystagmus.      Conjunctiva/sclera: Conjunctivae normal.      Pupils: Pupils are equal, round, and reactive to light.   Neck:      Thyroid: No thyromegaly.   Cardiovascular:      Rate and Rhythm: Normal rate and regular rhythm.   Pulmonary:      Effort: " Pulmonary effort is normal.      Breath sounds: Normal breath sounds.   Abdominal:      General: Bowel sounds are normal. There is no distension. There are no signs of injury.      Palpations: Abdomen is soft. There is no hepatomegaly or splenomegaly.      Tenderness: There is no abdominal tenderness.      Hernia: No hernia is present.   Musculoskeletal:      Cervical back: Neck supple.      Right lower leg: No edema.      Left lower leg: No edema.   Lymphadenopathy:      Cervical: No cervical adenopathy.   Skin:     General: Skin is warm and dry.      Capillary Refill: Capillary refill takes 2 to 3 seconds.      Coloration: Skin is not jaundiced or pale.      Findings: No bruising or petechiae.      Nails: There is no clubbing.   Neurological:      Mental Status: She is alert and oriented to person, place, and time.   Psychiatric:         Behavior: Behavior normal.         Thought Content: Thought content normal.         Judgment: Judgment normal.         Assessment / Plan      Assessment/Plan:   Diagnoses and all orders for this visit:    1. Gastroesophageal reflux disease with esophagitis without hemorrhage (Primary)  -     FL upper gi single contrast w kub; Future    2. Dillard's esophagus without dysplasia  -     omeprazole (priLOSEC) 40 MG capsule; Take 1 capsule by mouth 2 (Two) Times a Day.  Dispense: 180 capsule; Refill: 3  -     FL upper gi single contrast w kub; Future  Recommend increasing omeprazole to twice daily.  Recommend elevating the head of bed.   Encouraged to take 30 minutes before meal.  Encouraged small frequent meals rather than 1 large meal daily to minimize reflux.    Continue to avoid alcohol and tobacco as well as spicy, greasy, acidic foods.  Side effects of long-term PPI discussed      Follow Up:   Return in about 1 year (around 7/19/2022), or if symptoms worsen or fail to improve.    Plan of care reviewed with the patient at the conclusion of today's visit.  Education was provided  regarding diagnosis, management, and any prescribed or recommended OTC medications.  Patient verbalized understanding of and agreement with management plan.         YOHANA Curry  [facility]

## 2021-07-21 DIAGNOSIS — F51.04 PSYCHOPHYSIOLOGICAL INSOMNIA: ICD-10-CM

## 2021-07-21 DIAGNOSIS — Z91.09 ENVIRONMENTAL ALLERGIES: ICD-10-CM

## 2021-07-21 NOTE — TELEPHONE ENCOUNTER
Caller:  NARGIS FROM [facility]    Relationship: PHARMACIST WITH [facility]    Best call back number:     Medication needed:   Requested Prescriptions     Pending Prescriptions Disp Refills   • traZODone (DESYREL) 100 MG tablet 90 tablet 3     Sig: Take 1/2 to 1 tablet 30 minutes before bedtime as needed for sleep.   • fluticasone (Flonase) 50 MCG/ACT nasal spray 18.2 mL 11     Si sprays into the nostril(s) as directed by provider Daily.       When do you need the refill by: ASAP    What additional details did the patient provide when requesting the medication: THE PATIENT SUBMITTED REQUEST     Does the patient have less than a 3 day supply:  [x] Yes  [] No    What is the patient's preferred pharmacy:   [facility]

## 2021-07-22 RX ORDER — FLUTICASONE PROPIONATE 50 MCG
2 SPRAY, SUSPENSION (ML) NASAL DAILY
Qty: 18.2 ML | Refills: 11 | Status: SHIPPED | OUTPATIENT
Start: 2021-07-22

## 2021-07-22 RX ORDER — TRAZODONE HYDROCHLORIDE 100 MG/1
TABLET ORAL
Qty: 90 TABLET | Refills: 3 | Status: SHIPPED | OUTPATIENT
Start: 2021-07-22 | End: 2022-07-22 | Stop reason: SDUPTHER

## 2021-12-02 DIAGNOSIS — F41.8 DEPRESSION WITH ANXIETY: ICD-10-CM

## 2021-12-02 RX ORDER — DESVENLAFAXINE SUCCINATE 50 MG/1
TABLET, EXTENDED RELEASE ORAL
Qty: 90 TABLET | Refills: 3 | Status: SHIPPED | OUTPATIENT
Start: 2021-12-02 | End: 2022-05-24 | Stop reason: SDUPTHER

## 2022-03-22 ENCOUNTER — OFFICE VISIT (OUTPATIENT)
Dept: INTERNAL MEDICINE | Facility: CLINIC | Age: 47
End: 2022-03-22

## 2022-03-22 VITALS
HEIGHT: 64 IN | WEIGHT: 152 LBS | HEART RATE: 111 BPM | TEMPERATURE: 97.1 F | OXYGEN SATURATION: 98 % | DIASTOLIC BLOOD PRESSURE: 96 MMHG | SYSTOLIC BLOOD PRESSURE: 136 MMHG | BODY MASS INDEX: 25.95 KG/M2

## 2022-03-22 DIAGNOSIS — R25.2 MUSCLE CRAMPS: ICD-10-CM

## 2022-03-22 DIAGNOSIS — E87.6 HYPOKALEMIA: ICD-10-CM

## 2022-03-22 DIAGNOSIS — I10 HYPERTENSION, UNSPECIFIED TYPE: Primary | ICD-10-CM

## 2022-03-22 DIAGNOSIS — R19.7 DIARRHEA, UNSPECIFIED TYPE: ICD-10-CM

## 2022-03-22 PROCEDURE — 99214 OFFICE O/P EST MOD 30 MIN: CPT | Performed by: NURSE PRACTITIONER

## 2022-03-22 RX ORDER — AMLODIPINE BESYLATE 5 MG/1
5 TABLET ORAL DAILY
Qty: 30 TABLET | Refills: 1 | Status: SHIPPED | OUTPATIENT
Start: 2022-03-22 | End: 2022-04-18 | Stop reason: SDUPTHER

## 2022-03-23 LAB
ALBUMIN SERPL-MCNC: 4.4 G/DL (ref 3.8–4.8)
ALBUMIN/GLOB SERPL: 1.8 {RATIO} (ref 1.2–2.2)
ALP SERPL-CCNC: 130 IU/L (ref 44–121)
ALT SERPL-CCNC: 19 IU/L (ref 0–32)
AST SERPL-CCNC: 17 IU/L (ref 0–40)
BILIRUB SERPL-MCNC: 0.3 MG/DL (ref 0–1.2)
BUN SERPL-MCNC: 11 MG/DL (ref 6–24)
BUN/CREAT SERPL: 12 (ref 9–23)
CALCIUM SERPL-MCNC: 9.4 MG/DL (ref 8.7–10.2)
CHLORIDE SERPL-SCNC: 103 MMOL/L (ref 96–106)
CO2 SERPL-SCNC: 26 MMOL/L (ref 20–29)
CREAT SERPL-MCNC: 0.94 MG/DL (ref 0.57–1)
EGFRCR SERPLBLD CKD-EPI 2021: 77 ML/MIN/1.73
GLOBULIN SER CALC-MCNC: 2.5 G/DL (ref 1.5–4.5)
GLUCOSE SERPL-MCNC: 99 MG/DL (ref 65–99)
MAGNESIUM SERPL-MCNC: 2.1 MG/DL (ref 1.6–2.3)
POTASSIUM SERPL-SCNC: 3.4 MMOL/L (ref 3.5–5.2)
PROT SERPL-MCNC: 6.9 G/DL (ref 6–8.5)
SODIUM SERPL-SCNC: 144 MMOL/L (ref 134–144)

## 2022-04-18 DIAGNOSIS — I10 HYPERTENSION, UNSPECIFIED TYPE: ICD-10-CM

## 2022-04-18 RX ORDER — AMLODIPINE BESYLATE 5 MG/1
5 TABLET ORAL DAILY
Qty: 90 TABLET | Refills: 1 | Status: SHIPPED | OUTPATIENT
Start: 2022-04-18 | End: 2022-09-22

## 2022-05-23 ENCOUNTER — PATIENT MESSAGE (OUTPATIENT)
Dept: INTERNAL MEDICINE | Facility: CLINIC | Age: 47
End: 2022-05-23

## 2022-05-23 DIAGNOSIS — F41.8 DEPRESSION WITH ANXIETY: ICD-10-CM

## 2022-05-24 RX ORDER — DESVENLAFAXINE 25 MG/1
25 TABLET, EXTENDED RELEASE ORAL DAILY
Qty: 30 TABLET | Refills: 1 | Status: SHIPPED | OUTPATIENT
Start: 2022-05-24 | End: 2022-08-26

## 2022-05-24 NOTE — TELEPHONE ENCOUNTER
Penny Moore LPN 5/24/2022 8:11 AM EDT    Please advise    ----- Message -----  From: Phuong Becker  Sent: 5/23/2022 10:17 PM EDT  To: Satnam Lloyd [facility]  Subject: Depression Med     Antonia Feldman. I was wondering if we could switch my pristique to trintellix. I was having a conversation with Vanesa recently about thinking my pristiq wasn’t really doing it anymore. She suggested I talk to you about trintellix. Do you think that is something that could work for me?

## 2022-05-25 RX ORDER — VORTIOXETINE 5 MG/1
5 TABLET, FILM COATED ORAL
Qty: 30 TABLET | Refills: 0 | Status: SHIPPED | OUTPATIENT
Start: 2022-05-25 | End: 2022-08-26

## 2022-05-27 ENCOUNTER — PRIOR AUTHORIZATION (OUTPATIENT)
Dept: INTERNAL MEDICINE | Facility: CLINIC | Age: 47
End: 2022-05-27

## 2022-05-27 NOTE — TELEPHONE ENCOUNTER
Phuong Becker Key: [id number] - [place] Case ID: [id number] - Rx #: [id number]    Trintellix (formerly Brintellix) 5MG tablets    Approvedtoday  CaseId:[id number];Status:Approved;Review Type:Prior Auth;Coverage Start Date:04/27/2022;Coverage End Date:05/27/2023;    Notified pharmacy

## 2022-06-27 DIAGNOSIS — K22.70 BARRETT'S ESOPHAGUS WITHOUT DYSPLASIA: ICD-10-CM

## 2022-06-27 RX ORDER — OMEPRAZOLE 40 MG/1
CAPSULE, DELAYED RELEASE ORAL
Qty: 180 CAPSULE | Refills: 3 | Status: SHIPPED | OUTPATIENT
Start: 2022-06-27 | End: 2022-12-02

## 2022-07-22 DIAGNOSIS — F51.04 PSYCHOPHYSIOLOGICAL INSOMNIA: ICD-10-CM

## 2022-07-22 RX ORDER — TRAZODONE HYDROCHLORIDE 100 MG/1
TABLET ORAL
Qty: 90 TABLET | Refills: 3 | Status: SHIPPED | OUTPATIENT
Start: 2022-07-22

## 2022-08-26 ENCOUNTER — OFFICE VISIT (OUTPATIENT)
Dept: INTERNAL MEDICINE | Facility: CLINIC | Age: 47
End: 2022-08-26

## 2022-08-26 VITALS
HEART RATE: 100 BPM | TEMPERATURE: 97.3 F | WEIGHT: 154 LBS | SYSTOLIC BLOOD PRESSURE: 110 MMHG | HEIGHT: 64 IN | BODY MASS INDEX: 26.29 KG/M2 | OXYGEN SATURATION: 98 % | DIASTOLIC BLOOD PRESSURE: 78 MMHG

## 2022-08-26 DIAGNOSIS — Z86.2 HISTORY OF ANEMIA: ICD-10-CM

## 2022-08-26 DIAGNOSIS — Z13.0 SCREENING FOR ENDOCRINE, METABOLIC AND IMMUNITY DISORDER: ICD-10-CM

## 2022-08-26 DIAGNOSIS — K21.00 GASTROESOPHAGEAL REFLUX DISEASE WITH ESOPHAGITIS WITHOUT HEMORRHAGE: ICD-10-CM

## 2022-08-26 DIAGNOSIS — I10 PRIMARY HYPERTENSION: ICD-10-CM

## 2022-08-26 DIAGNOSIS — E66.3 OVERWEIGHT WITH BODY MASS INDEX (BMI) OF 26 TO 26.9 IN ADULT: ICD-10-CM

## 2022-08-26 DIAGNOSIS — F51.04 PSYCHOPHYSIOLOGICAL INSOMNIA: ICD-10-CM

## 2022-08-26 DIAGNOSIS — Z13.220 SCREENING FOR LIPID DISORDERS: ICD-10-CM

## 2022-08-26 DIAGNOSIS — E78.5 HYPERLIPIDEMIA, UNSPECIFIED HYPERLIPIDEMIA TYPE: ICD-10-CM

## 2022-08-26 DIAGNOSIS — E87.6 HYPOKALEMIA: ICD-10-CM

## 2022-08-26 DIAGNOSIS — F41.8 DEPRESSION WITH ANXIETY: ICD-10-CM

## 2022-08-26 DIAGNOSIS — Z13.29 SCREENING FOR ENDOCRINE, METABOLIC AND IMMUNITY DISORDER: ICD-10-CM

## 2022-08-26 DIAGNOSIS — K22.70 BARRETT'S ESOPHAGUS WITHOUT DYSPLASIA: Chronic | ICD-10-CM

## 2022-08-26 DIAGNOSIS — Z13.228 SCREENING FOR ENDOCRINE, METABOLIC AND IMMUNITY DISORDER: ICD-10-CM

## 2022-08-26 DIAGNOSIS — E55.9 VITAMIN D DEFICIENCY: ICD-10-CM

## 2022-08-26 DIAGNOSIS — R53.83 FATIGUE, UNSPECIFIED TYPE: ICD-10-CM

## 2022-08-26 DIAGNOSIS — Z00.00 ANNUAL PHYSICAL EXAM: Primary | ICD-10-CM

## 2022-08-26 LAB
25(OH)D3+25(OH)D2 SERPL-MCNC: 199 NG/ML (ref 30–100)
ALBUMIN SERPL-MCNC: 4.4 G/DL (ref 3.5–5.2)
ALBUMIN/GLOB SERPL: 1.9 G/DL
ALP SERPL-CCNC: 92 U/L (ref 39–117)
ALT SERPL-CCNC: 26 U/L (ref 1–33)
AST SERPL-CCNC: 21 U/L (ref 1–32)
BASOPHILS # BLD AUTO: 0.05 10*3/MM3 (ref 0–0.2)
BASOPHILS NFR BLD AUTO: 0.6 % (ref 0–1.5)
BILIRUB SERPL-MCNC: 0.4 MG/DL (ref 0–1.2)
BUN SERPL-MCNC: 14 MG/DL (ref 6–20)
BUN/CREAT SERPL: 16.5 (ref 7–25)
CALCIUM SERPL-MCNC: 9.6 MG/DL (ref 8.6–10.5)
CHLORIDE SERPL-SCNC: 103 MMOL/L (ref 98–107)
CHOLEST SERPL-MCNC: 203 MG/DL (ref 0–200)
CO2 SERPL-SCNC: 31.2 MMOL/L (ref 22–29)
CREAT SERPL-MCNC: 0.85 MG/DL (ref 0.57–1)
EGFRCR-CYS SERPLBLD CKD-EPI 2021: 86.8 ML/MIN/1.73
EOSINOPHIL # BLD AUTO: 0.33 10*3/MM3 (ref 0–0.4)
EOSINOPHIL NFR BLD AUTO: 3.9 % (ref 0.3–6.2)
ERYTHROCYTE [DISTWIDTH] IN BLOOD BY AUTOMATED COUNT: 13.8 % (ref 12.3–15.4)
GLOBULIN SER CALC-MCNC: 2.3 GM/DL
GLUCOSE SERPL-MCNC: 103 MG/DL (ref 65–99)
HCT VFR BLD AUTO: 41.6 % (ref 34–46.6)
HDLC SERPL-MCNC: 40 MG/DL (ref 40–60)
HGB BLD-MCNC: 14.3 G/DL (ref 12–15.9)
IMM GRANULOCYTES # BLD AUTO: 0.02 10*3/MM3 (ref 0–0.05)
IMM GRANULOCYTES NFR BLD AUTO: 0.2 % (ref 0–0.5)
IRON SERPL-MCNC: 73 MCG/DL (ref 37–145)
LDLC SERPL CALC-MCNC: 131 MG/DL (ref 0–100)
LYMPHOCYTES # BLD AUTO: 4.6 10*3/MM3 (ref 0.7–3.1)
LYMPHOCYTES NFR BLD AUTO: 53.7 % (ref 19.6–45.3)
MCH RBC QN AUTO: 29.4 PG (ref 26.6–33)
MCHC RBC AUTO-ENTMCNC: 34.4 G/DL (ref 31.5–35.7)
MCV RBC AUTO: 85.6 FL (ref 79–97)
MONOCYTES # BLD AUTO: 0.63 10*3/MM3 (ref 0.1–0.9)
MONOCYTES NFR BLD AUTO: 7.4 % (ref 5–12)
NEUTROPHILS # BLD AUTO: 2.93 10*3/MM3 (ref 1.7–7)
NEUTROPHILS NFR BLD AUTO: 34.2 % (ref 42.7–76)
NRBC BLD AUTO-RTO: 0 /100 WBC (ref 0–0.2)
PLATELET # BLD AUTO: 251 10*3/MM3 (ref 140–450)
POTASSIUM SERPL-SCNC: 3.8 MMOL/L (ref 3.5–5.2)
PROT SERPL-MCNC: 6.7 G/DL (ref 6–8.5)
RBC # BLD AUTO: 4.86 10*6/MM3 (ref 3.77–5.28)
SODIUM SERPL-SCNC: 142 MMOL/L (ref 136–145)
T4 FREE SERPL-MCNC: 1.37 NG/DL (ref 0.93–1.7)
TRIGL SERPL-MCNC: 180 MG/DL (ref 0–150)
TSH SERPL DL<=0.005 MIU/L-ACNC: 1.31 UIU/ML (ref 0.27–4.2)
VIT B12 SERPL-MCNC: 1603 PG/ML (ref 211–946)
VLDLC SERPL CALC-MCNC: 32 MG/DL (ref 5–40)
WBC # BLD AUTO: 8.56 10*3/MM3 (ref 3.4–10.8)

## 2022-08-26 PROCEDURE — 99396 PREV VISIT EST AGE 40-64: CPT | Performed by: NURSE PRACTITIONER

## 2022-08-26 RX ORDER — DESVENLAFAXINE SUCCINATE 50 MG/1
TABLET, EXTENDED RELEASE ORAL
COMMUNITY
Start: 2022-05-31 | End: 2022-11-28

## 2022-08-26 NOTE — PROGRESS NOTES
Chief Complaint   Patient presents with   • Annual Exam       Phuong Becker is a 46 y.o. female and is here for a comprehensive physical exam. Requesting paperwork for application to adopt children be signed today.     Believes she may be having atypical migraines. Ton of pressure on top of her eyes.  She feels like she much lift her head and look out of the bottom of her eyes.  Occasionally has dizziness and nausea.  Triggers have been identified - bright blue lights, most fragrances.  Phonophobia.  Does have motion sickness.  She has taken ibuprofen, effective.  Feels the world is moving when she is dizzy.      HTN: takes amlodipine 5 mg daily.  Tries to eat a healthy diet.  Denies chest pain, dyspnea, orthopnea, palpitations, lower extremity edema, confusion, headaches, weakness.    Takes prilosec daily for Dillard's esophagus. Has not required OTC medication for breakthrough reflux.  Follows Dr Watts, GI.  Last appointment in 2019, to follow up in 3 years.  Renetta will make appointment.       Refuses mammogram today. States she had genetic testing that indicated she is not at risk for breast cancer.     Recent COVID, symptoms resolved.      Depression, anxiety: takes desvenlafaxine 50 mg daily, feels it works fairly well.  Insomnia well managed with trazodone.  Denies anhedonia, hypersomnia, fatigue, feelings of worthlessness, difficulty concentrating, impaired memory, SI, HI, panic attacks, weight change.     More tired than she normally is.  H/O vitamin D deficiency, anemia.  Denies weight change, temperature intolerance, hair/skin/nail changes, bowel habit changes, palpitations, sore throat, hoarseness.         History:  LMP: No LMP recorded. Patient has had a hysterectomy.  Menopause at 41 years  Sexual activity:  Not really. Sex in painful. Heterosexual, monogamous relationship x 9 years.    : 4  Para: 3    Do you take any herbs or supplements that were not prescribed by a doctor? yes.   Calcium, cinnamon, women's MVN, Vitamin D    Health Habits:  Dental Exam. up to date  Eye Exam. not up to date - wears readers, otherwise no vision change  Exercise: 3 times/week.  Current exercise activities include: cardiovascular workout on exercise equipment    Health Maintenance   Topic Date Due   • COVID-19 Vaccine (3 - Booster for Pfizer series) 10/13/2021   • LIPID PANEL  08/26/2022   • MAMMOGRAM  08/26/2023 (Originally 1975)   • INFLUENZA VACCINE  10/01/2022   • ANNUAL PHYSICAL  08/27/2023   • TDAP/TD VACCINES (3 - Td or Tdap) 06/11/2031   • HEPATITIS C SCREENING  Completed   • Pneumococcal Vaccine 0-64  Aged Out   • PAP SMEAR  Discontinued       PMH, PSH, SocHx, FamHx, Allergies, and Medications: Reviewed and updated in the Visit Navigator.     Allergies   Allergen Reactions   • Penicillins Hives     Past Medical History:   Diagnosis Date   • Anemia    • Dillard esophagus    • Dillard's esophagus without dysplasia 4/11/2019    Continue omeprazole daily.    • Depression    • Diverticulitis of colon    • Diverticulosis    • Gastroesophageal reflux disease 9/17/2018   • GERD (gastroesophageal reflux disease)      Past Surgical History:   Procedure Laterality Date   • ENDOMETRIAL ABLATION     • TOTAL ABDOMINAL HYSTERECTOMY WITH SALPINGO OOPHORECTOMY     • UPPER GASTROINTESTINAL ENDOSCOPY       Social History     Socioeconomic History   • Marital status:    Tobacco Use   • Smoking status: Former Smoker     Packs/day: 0.50     Years: 25.00     Pack years: 12.50     Types: Cigarettes     Quit date: 2/18/2019     Years since quitting: 3.5   • Smokeless tobacco: Never Used   • Tobacco comment: menthol   Vaping Use   • Vaping Use: Never used   Substance and Sexual Activity   • Alcohol use: Yes     Comment: rare   • Drug use: No   • Sexual activity: Yes     Birth control/protection: Post-menopausal, Surgical     Family History   Problem Relation Age of Onset   • SHAHID disease Mother    • Hypertension Mother  "   • Hyperlipidemia Mother    • Osteoporosis Mother    • Migraines Sister    • Cervical cancer Maternal Aunt    • Ovarian cancer Maternal Aunt    • Diabetes Maternal Aunt    • Diabetes Maternal Uncle    • Diabetes Paternal Aunt    • Diabetes Paternal Uncle    • Hypertension Paternal Uncle    • Heart attack Cousin    • Colon cancer Neg Hx    • Colon polyps Neg Hx    • Esophageal cancer Neg Hx        Review of Systems  Review of Systems   All other systems reviewed and are negative.      Objective   /78   Pulse 100   Temp 97.3 °F (36.3 °C)   Ht 162.6 cm (64.02\")   Wt 69.9 kg (154 lb)   SpO2 98%   BMI 26.42 kg/m²   Body mass index is 26.42 kg/m².    Physical Exam  Constitutional:       Appearance: She is well-developed. She is not ill-appearing.   HENT:      Head: Normocephalic.      Right Ear: Tympanic membrane, ear canal and external ear normal.      Left Ear: Tympanic membrane, ear canal and external ear normal.      Nose: Nose normal.      Mouth/Throat:      Mouth: Mucous membranes are moist.      Pharynx: Oropharynx is clear. Uvula midline.   Eyes:      Extraocular Movements: Extraocular movements intact.      Conjunctiva/sclera: Conjunctivae normal.      Pupils: Pupils are equal, round, and reactive to light.   Neck:      Thyroid: No thyromegaly.   Cardiovascular:      Rate and Rhythm: Regular rhythm. Tachycardia present.      Pulses:           Radial pulses are 2+ on the right side and 2+ on the left side.        Dorsalis pedis pulses are 2+ on the right side and 2+ on the left side.      Heart sounds: Normal heart sounds.   Pulmonary:      Effort: Pulmonary effort is normal.      Breath sounds: Normal breath sounds.   Abdominal:      General: Bowel sounds are normal.      Palpations: Abdomen is soft.      Tenderness: There is no abdominal tenderness.   Musculoskeletal:         General: No tenderness or deformity. Normal range of motion.      Cervical back: Full passive range of motion without pain, " normal range of motion and neck supple.      Right lower leg: No edema.      Left lower leg: No edema.   Lymphadenopathy:      Cervical: No cervical adenopathy.   Skin:     General: Skin is warm.      Capillary Refill: Capillary refill takes less than 2 seconds.   Neurological:      Mental Status: She is alert and oriented to person, place, and time.      Sensory: No sensory deficit.      Coordination: Coordination normal.      Gait: Gait normal.      Comments: CN II-XII normal   Psychiatric:         Attention and Perception: Attention normal.         Mood and Affect: Mood and affect normal.         Speech: Speech normal.         Behavior: Behavior normal.         Thought Content: Thought content normal.         The 10-year CVD risk score (STEPHANIE'Agostino, et al., 2008) is: 3.3%    Values used to calculate the score:      Age: 46 years      Sex: Female      Diabetic: No      Tobacco smoker: No      Systolic Blood Pressure: 110 mmHg      Is BP treated: Yes      HDL Cholesterol: 45 mg/dL      Total Cholesterol: 196 mg/dL      Assessment & Plan   1. Healthy female exam.    2. Patient Counseling: Including but not Limited to the following, when appropriate:  --Nutrition: Stressed importance of moderation in sodium/caffeine intake, saturated fat and cholesterol, caloric balance, sufficient intake of fresh fruits, vegetables, fiber, calcium, iron  --Exercise: Stressed the importance of regular exercise.   --Substance Abuse: Discussed cessation/primary prevention of tobacco, alcohol, or other drug use; driving or other dangerous activities under the influence; availability of treatment for abuse, as indicated based on social history.    --Sexuality: Discussed sexually transmitted diseases, partner selection, use of condoms, avoidance of unintended pregnancy  and contraceptive alternatives.   --Injury prevention: Discussed safety belts, safety helmets, smoke detector, smoking near bedding or upholstery.   --Dental health:  Discussed importance of regular tooth brushing, flossing, and dental visits.  --Immunizations reviewed.  3. Discussed the patient's BMI with her.  The BMI is above average; BMI management plan is completed  4. Return in about 1 year (around 8/26/2023) for Annual.  5. Age-appropriate Screening Scheduled    Assessment & Plan     Diagnoses and all orders for this visit:    1. Annual physical exam (Primary)    2. Dillard's esophagus without dysplasia       - To call GI office and schedule appointment for follow up, likely EGD    3. Gastroesophageal reflux disease with esophagitis without hemorrhage        - Avoid triggers such as spicy or greasy food, alcohol, chocolate, caffeine, carbonated beverages, tomatoes and tomato sauces, onions, smoking        - May raise HOB 30 degrees with risers or blocks, if desired        - Do not eat within 2-3 hours of lying down        - Avoid clothing, belts that constrict midsection        - Continue medication as prescribed     4. Depression with anxiety        - Encouraged to take part in daily physical exercise.          - Eat healthy, well balanced diet; avoid sugary foods or beverages        - Limit alcohol intake        - Ensure good night's sleep by creating calm space in bedroom, avoiding screen time 1-2 hours before bed, no caffeine after 5 pm        - Talk to supportive family and friends, as needed        - Continue medication as prescribed     5. Psychophysiological insomnia        - Trazodone as prescribed     6. Hyperlipidemia, unspecified hyperlipidemia type  -     Lipid Panel    7. Hypokalemia  -     Comprehensive Metabolic Panel    8. Fatigue, unspecified type  -     Vitamin B12  -     TSH  -     T4, Free  -     CBC & Differential  -     Iron    9. Vitamin D deficiency  -     Vitamin D 25 Hydroxy    10. Screening for endocrine, metabolic and immunity disorder  -     Comprehensive Metabolic Panel  -     TSH  -     T4, Free    11. History of anemia  -     Vitamin B12  -      CBC & Differential  -     Iron    12. Overweight with body mass index (BMI) of 26 to 26.9 in adult        - BMI is >= 25 and <30. (Overweight) The following options were offered after discussion;: exercise counseling/recommendations and nutrition counseling/recommendations    13. Primary hypertension        - Follow heart healthy diet.  Keep sodium intake < 1500 mg per day.  Avoid processed & fast foods.          - Exercise as tolerated, with a goal of 30 minutes of moderate exercise most days.         - Take medications as prescribed.

## 2022-09-22 DIAGNOSIS — I10 HYPERTENSION, UNSPECIFIED TYPE: ICD-10-CM

## 2022-09-22 RX ORDER — AMLODIPINE BESYLATE 5 MG/1
TABLET ORAL
Qty: 90 TABLET | Refills: 3 | Status: SHIPPED | OUTPATIENT
Start: 2022-09-22

## 2022-11-28 RX ORDER — DESVENLAFAXINE SUCCINATE 50 MG/1
TABLET, EXTENDED RELEASE ORAL
Qty: 90 TABLET | Refills: 2 | Status: SHIPPED | OUTPATIENT
Start: 2022-11-28

## 2022-11-28 NOTE — TELEPHONE ENCOUNTER
Rx Refill Note  Requested Prescriptions     Pending Prescriptions Disp Refills   • desvenlafaxine (PRISTIQ) 50 MG 24 hr tablet [Pharmacy Med Name: DESVENLAFAXINE SUCCINATE ER TABS 50MG] 90 tablet 3     Sig: TAKE 1 TABLET DAILY      Last office visit with prescribing clinician: 08/26/2022      Next office visit with prescribing clinician: Visit date not found            Conchita Bettencourt LPN  11/28/22, 13:12 EST

## 2022-12-02 ENCOUNTER — OFFICE VISIT (OUTPATIENT)
Dept: GASTROENTEROLOGY | Facility: CLINIC | Age: 47
End: 2022-12-02

## 2022-12-02 VITALS
BODY MASS INDEX: 26.46 KG/M2 | DIASTOLIC BLOOD PRESSURE: 78 MMHG | OXYGEN SATURATION: 98 % | HEIGHT: 64 IN | WEIGHT: 155 LBS | TEMPERATURE: 97.6 F | SYSTOLIC BLOOD PRESSURE: 130 MMHG | HEART RATE: 97 BPM

## 2022-12-02 DIAGNOSIS — K22.70 BARRETT'S ESOPHAGUS WITHOUT DYSPLASIA: Primary | ICD-10-CM

## 2022-12-02 DIAGNOSIS — K21.00 GASTROESOPHAGEAL REFLUX DISEASE WITH ESOPHAGITIS WITHOUT HEMORRHAGE: ICD-10-CM

## 2022-12-02 DIAGNOSIS — Z12.11 ENCOUNTER FOR SCREENING COLONOSCOPY: ICD-10-CM

## 2022-12-02 PROCEDURE — 99214 OFFICE O/P EST MOD 30 MIN: CPT | Performed by: NURSE PRACTITIONER

## 2022-12-02 RX ORDER — ESOMEPRAZOLE MAGNESIUM 40 MG/1
40 CAPSULE, DELAYED RELEASE ORAL 2 TIMES DAILY
Qty: 180 CAPSULE | Refills: 3 | Status: SHIPPED | OUTPATIENT
Start: 2022-12-02

## 2022-12-02 NOTE — PROGRESS NOTES
Follow Up      Patient Name: Phuong Becker  : 1975   MRN: [id number]     Chief Complaint:    Chief Complaint   Patient presents with   • Follow-up     GERD       History of Present Illness: Phuong Becker is a 47 y.o. female who is here today for follow up on Dillard's esophagus    She is compliant with her PPI- twice a day..  Breakthrough about once a week that causes nausea and sometimes vomiting.  No dysphagia.  Having normal bms.  No alcohol or tobacco.    No blood in her stool.  Normal BMs.  Appetite good.  ENDOSCOPY, INT (2019)- gastritis, esophagitis, HH + for Dillard's- 3 year recall     Subjective      Review of Systems:   Review of Systems   Constitutional: Negative for appetite change and unexpected weight loss.   HENT: Negative for trouble swallowing.    Gastrointestinal: Positive for nausea, vomiting and GERD. Negative for abdominal distention, abdominal pain, anal bleeding, blood in stool, constipation, diarrhea, rectal pain and indigestion.       Medications:     Current Outpatient Medications:   •  amLODIPine (NORVASC) 5 MG tablet, TAKE 1 TABLET DAILY, Disp: 90 tablet, Rfl: 3  •  desvenlafaxine (PRISTIQ) 50 MG 24 hr tablet, TAKE 1 TABLET DAILY, Disp: 90 tablet, Rfl: 2  •  fluticasone (Flonase) 50 MCG/ACT nasal spray, 2 sprays into the nostril(s) as directed by provider Daily., Disp: 18.2 mL, Rfl: 11  •  traZODone (DESYREL) 100 MG tablet, Take 1/2 to 1 tablet 30 minutes before bedtime as needed for sleep., Disp: 90 tablet, Rfl: 3  •  esomeprazole (nexIUM) 40 MG capsule, Take 1 capsule by mouth 2 (Two) Times a Day., Disp: 180 capsule, Rfl: 3    Allergies:   Allergies   Allergen Reactions   • Penicillins Hives       Social History:   Social History     Socioeconomic History   • Marital status:    Tobacco Use   • Smoking status: Former     Packs/day: 0.50     Years: 25.00     Pack years: 12.50     Types: Cigarettes     Quit date: 2019     Years since quitting: 3.7  "  • Smokeless tobacco: Never   • Tobacco comments:     menthol   Vaping Use   • Vaping Use: Never used   Substance and Sexual Activity   • Alcohol use: Not Currently     Comment: rare   • Drug use: No   • Sexual activity: Yes     Birth control/protection: Post-menopausal, Surgical        Surgical History:   Past Surgical History:   Procedure Laterality Date   • ENDOMETRIAL ABLATION     • TOTAL ABDOMINAL HYSTERECTOMY WITH SALPINGO OOPHORECTOMY     • UPPER GASTROINTESTINAL ENDOSCOPY          Medical History:   Past Medical History:   Diagnosis Date   • Anemia    • Dillard esophagus    • Dillard's esophagus without dysplasia 4/11/2019    Continue omeprazole daily.    • Depression    • Diverticulitis of colon    • Diverticulosis    • Gastroesophageal reflux disease 9/17/2018   • GERD (gastroesophageal reflux disease)         Objective     Physical Exam:  Vital Signs:   Vitals:    12/02/22 1129   BP: 130/78   BP Location: Left arm   Patient Position: Sitting   Cuff Size: Adult   Pulse: 97   Temp: 97.6 °F (36.4 °C)   TempSrc: Temporal   SpO2: 98%   Weight: 70.3 kg (155 lb)   Height: 162.6 cm (64.02\")     Body mass index is 26.59 kg/m².     Physical Exam  Vitals and nursing note reviewed.   Constitutional:       General: She is not in acute distress.     Appearance: She is well-developed. She is not diaphoretic.   Eyes:      General: No scleral icterus.     Conjunctiva/sclera: Conjunctivae normal.   Neck:      Thyroid: No thyromegaly.   Cardiovascular:      Rate and Rhythm: Normal rate and regular rhythm.   Pulmonary:      Effort: Pulmonary effort is normal.      Breath sounds: Normal breath sounds.   Abdominal:      General: Bowel sounds are normal. There is no distension.      Palpations: Abdomen is soft.      Tenderness: There is no abdominal tenderness. There is no guarding or rebound.      Hernia: No hernia is present.   Musculoskeletal:      Cervical back: Neck supple.      Right lower leg: No edema.      Left lower " leg: No edema.   Skin:     General: Skin is warm and dry.      Capillary Refill: Capillary refill takes 2 to 3 seconds.      Coloration: Skin is not jaundiced or pale.      Findings: No bruising or petechiae.      Nails: There is no clubbing.   Neurological:      Mental Status: She is alert and oriented to person, place, and time.   Psychiatric:         Behavior: Behavior normal.         Thought Content: Thought content normal.         Judgment: Judgment normal.         Assessment / Plan      Assessment/Plan:   Diagnoses and all orders for this visit:    1. Dillard's esophagus without dysplasia (Primary)  -     esomeprazole (nexIUM) 40 MG capsule; Take 1 capsule by mouth 2 (Two) Times a Day.  Dispense: 180 capsule; Refill: 3  -     Ambulatory referral for Screening EGD    2. Gastroesophageal reflux disease with esophagitis without hemorrhage  -     esomeprazole (nexIUM) 40 MG capsule; Take 1 capsule by mouth 2 (Two) Times a Day.  Dispense: 180 capsule; Refill: 3  -     Ambulatory referral for Screening EGD    3. Encounter for screening colonoscopy  -     Ambulatory Referral For Screening Colonoscopy    Will trial Nexium, DC omeprazole.  Continue twice daily.  Strict reflux precautions discussed.  She is due for her recall in March, she is also due for her first colon cancer screening.    Follow Up:   Return in about 1 year (around 12/2/2023), or if symptoms worsen or fail to improve.    Plan of care reviewed with the patient at the conclusion of today's visit.  Education was provided regarding diagnosis, management, and any prescribed or recommended OTC medications.  Patient verbalized understanding of and agreement with management plan.         YOHANA Curry  [facility] [place] [facility]

## 2023-02-20 RX ORDER — SODIUM, POTASSIUM,MAG SULFATES 17.5-3.13G
1 SOLUTION, RECONSTITUTED, ORAL ORAL TAKE AS DIRECTED
Qty: 354 ML | Refills: 0 | Status: SHIPPED | OUTPATIENT
Start: 2023-02-20

## 2023-03-02 ENCOUNTER — OUTSIDE FACILITY SERVICE (OUTPATIENT)
Dept: GASTROENTEROLOGY | Facility: CLINIC | Age: 48
End: 2023-03-02
Payer: COMMERCIAL

## 2023-03-02 ENCOUNTER — LAB REQUISITION (OUTPATIENT)
Dept: LAB | Facility: HOSPITAL | Age: 48
End: 2023-03-02
Payer: COMMERCIAL

## 2023-03-02 DIAGNOSIS — K21.00 GASTRO-ESOPHAGEAL REFLUX DISEASE WITH ESOPHAGITIS, WITHOUT BLEEDING: ICD-10-CM

## 2023-03-02 DIAGNOSIS — K21.9 GASTRO-ESOPHAGEAL REFLUX DISEASE WITHOUT ESOPHAGITIS: ICD-10-CM

## 2023-03-02 DIAGNOSIS — K22.89 OTHER SPECIFIED DISEASE OF ESOPHAGUS: ICD-10-CM

## 2023-03-02 DIAGNOSIS — K44.9 DIAPHRAGMATIC HERNIA WITHOUT OBSTRUCTION OR GANGRENE: ICD-10-CM

## 2023-03-02 PROCEDURE — 88305 TISSUE EXAM BY PATHOLOGIST: CPT

## 2023-03-02 PROCEDURE — 45378 DIAGNOSTIC COLONOSCOPY: CPT | Performed by: INTERNAL MEDICINE

## 2023-03-02 PROCEDURE — 43239 EGD BIOPSY SINGLE/MULTIPLE: CPT | Performed by: INTERNAL MEDICINE

## 2023-03-06 LAB — REF LAB TEST METHOD: NORMAL

## 2023-03-07 ENCOUNTER — TELEPHONE (OUTPATIENT)
Dept: GASTROENTEROLOGY | Facility: CLINIC | Age: 48
End: 2023-03-07
Payer: COMMERCIAL

## 2023-03-07 NOTE — TELEPHONE ENCOUNTER
I called and left a message about the pathology.  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  I suggested to continue the proton pump medication daily.

## 2023-04-29 ENCOUNTER — PATIENT MESSAGE (OUTPATIENT)
Dept: INTERNAL MEDICINE | Facility: CLINIC | Age: 48
End: 2023-04-29
Payer: COMMERCIAL

## 2023-08-24 RX ORDER — DESVENLAFAXINE SUCCINATE 50 MG/1
TABLET, EXTENDED RELEASE ORAL
Qty: 90 TABLET | Refills: 3 | Status: SHIPPED | OUTPATIENT
Start: 2023-08-24

## 2023-09-18 DIAGNOSIS — I10 HYPERTENSION, UNSPECIFIED TYPE: ICD-10-CM

## 2023-09-18 RX ORDER — AMLODIPINE BESYLATE 5 MG/1
TABLET ORAL
Qty: 90 TABLET | Refills: 3 | Status: SHIPPED | OUTPATIENT
Start: 2023-09-18

## 2023-11-14 DIAGNOSIS — K21.00 GASTROESOPHAGEAL REFLUX DISEASE WITH ESOPHAGITIS WITHOUT HEMORRHAGE: ICD-10-CM

## 2023-11-14 DIAGNOSIS — K22.70 BARRETT'S ESOPHAGUS WITHOUT DYSPLASIA: ICD-10-CM

## 2023-11-14 RX ORDER — ESOMEPRAZOLE MAGNESIUM 40 MG/1
40 CAPSULE, DELAYED RELEASE ORAL 2 TIMES DAILY
Qty: 180 CAPSULE | Refills: 3 | Status: SHIPPED | OUTPATIENT
Start: 2023-11-14

## 2023-11-14 NOTE — TELEPHONE ENCOUNTER
Rx Refill Note  Pending Prescriptions:                       Disp   Refills    esomeprazole (nexIUM) 40 MG capsule [Pharm*180 ca*3        Sig: TAKE 1 CAPSULE TWICE A DAY    Last office visit with prescribing clinician: 12/2/2022   Last telemedicine visit with prescribing clinician: Visit date not found   Next office visit with prescribing clinician: 12/4/2023         Charlee Ko MA  11/14/23, 08:15 EST

## 2023-12-04 ENCOUNTER — OFFICE VISIT (OUTPATIENT)
Dept: GASTROENTEROLOGY | Facility: CLINIC | Age: 48
End: 2023-12-04
Payer: COMMERCIAL

## 2023-12-04 VITALS
SYSTOLIC BLOOD PRESSURE: 136 MMHG | BODY MASS INDEX: 27.74 KG/M2 | HEART RATE: 101 BPM | WEIGHT: 161.6 LBS | DIASTOLIC BLOOD PRESSURE: 78 MMHG | OXYGEN SATURATION: 97 % | TEMPERATURE: 97.3 F

## 2023-12-04 DIAGNOSIS — K22.70 BARRETT'S ESOPHAGUS WITHOUT DYSPLASIA: ICD-10-CM

## 2023-12-04 DIAGNOSIS — K21.00 GASTROESOPHAGEAL REFLUX DISEASE WITH ESOPHAGITIS WITHOUT HEMORRHAGE: ICD-10-CM

## 2023-12-04 PROCEDURE — 99213 OFFICE O/P EST LOW 20 MIN: CPT | Performed by: NURSE PRACTITIONER

## 2023-12-04 RX ORDER — ESOMEPRAZOLE MAGNESIUM 40 MG/1
40 CAPSULE, DELAYED RELEASE ORAL 2 TIMES DAILY
Qty: 240 CAPSULE | Refills: 3 | Status: SHIPPED | OUTPATIENT
Start: 2023-12-04

## 2023-12-04 NOTE — PROGRESS NOTES
Follow Up      Patient Name: Phuong Becker  : 1975   MRN: [id number]     Chief Complaint:    Chief Complaint   Patient presents with    Follow-up       History of Present Illness: Phuong Becker is a 48 y.o. female who is here today for follow up on Stanley's esophagus.     She is compliant with her PPI- twice a day..  Breakthrough about once a month.  No dysphagia.  Having normal bms.  No alcohol or tobacco.    No blood in her stool.  Normal BMs.  Appetite good.      SCANNED - COLONOSCOPY (2023) (Dr Watts)-diverticulosis, non bleeding hemorrhoids- 5 year recall  ENDOSCOPY, INT (2023) (Dr Watts)-medium HH, short segment stanley's-biopsies + for stanley;s without dysplasia   ENDOSCOPY, INT (2019)- (Dr Watts)- gastritis, esophagitis, HH + for Stanley's- 3 year recall     Subjective      Review of Systems:   Review of Systems   Constitutional:  Negative for appetite change and unexpected weight loss.   HENT:  Negative for trouble swallowing.    Gastrointestinal:  Positive for GERD. Negative for abdominal distention, abdominal pain, anal bleeding, blood in stool, constipation, diarrhea, nausea, rectal pain, vomiting and indigestion.       Medications:     Current Outpatient Medications:     amLODIPine (NORVASC) 5 MG tablet, TAKE 1 TABLET DAILY, Disp: 90 tablet, Rfl: 3    azithromycin (Zithromax Z-Adalberto) 250 MG tablet, Take 2 tablets the first day, then 1 tablet daily for 4 days., Disp: 6 tablet, Rfl: 0    desvenlafaxine (PRISTIQ) 50 MG 24 hr tablet, TAKE 1 TABLET DAILY, Disp: 90 tablet, Rfl: 3    esomeprazole (nexIUM) 40 MG capsule, Take 1 capsule by mouth 2 (Two) Times a Day., Disp: 240 capsule, Rfl: 3    fluticasone (Flonase) 50 MCG/ACT nasal spray, 2 sprays into the nostril(s) as directed by provider Daily., Disp: 18.2 mL, Rfl: 11    traZODone (DESYREL) 100 MG tablet, Take 1/2 to 1 tablet 30 minutes before bedtime as needed for sleep., Disp: 90 tablet, Rfl:  3    Allergies:   Allergies   Allergen Reactions    Penicillins Hives       Social History:   Social History     Socioeconomic History    Marital status:    Tobacco Use    Smoking status: Former     Packs/day: 0.50     Years: 25.00     Additional pack years: 0.00     Total pack years: 12.50     Types: Cigarettes     Quit date: 2019     Years since quittin.7    Smokeless tobacco: Never    Tobacco comments:     menthol   Vaping Use    Vaping Use: Never used   Substance and Sexual Activity    Alcohol use: Not Currently     Comment: rare    Drug use: No    Sexual activity: Yes     Birth control/protection: Post-menopausal, Surgical        Surgical History:   Past Surgical History:   Procedure Laterality Date    ENDOMETRIAL ABLATION      TOTAL ABDOMINAL HYSTERECTOMY WITH SALPINGO OOPHORECTOMY      UPPER GASTROINTESTINAL ENDOSCOPY          Medical History:   Past Medical History:   Diagnosis Date    Anemia     Dillard esophagus     Dillard's esophagus without dysplasia 2019    Continue omeprazole daily.     Depression     Diverticulitis of colon     Diverticulosis     Gastroesophageal reflux disease 2018    GERD (gastroesophageal reflux disease)         Objective     Physical Exam:  Vital Signs:   Vitals:    23 1037   BP: 136/78   Pulse: 101   Temp: 97.3 °F (36.3 °C)   SpO2: 97%   Weight: 73.3 kg (161 lb 9.6 oz)     Body mass index is 27.74 kg/m².     Physical Exam  Constitutional:       General: She is not in acute distress.     Appearance: She is well-developed.   Pulmonary:      Effort: Pulmonary effort is normal. No accessory muscle usage or respiratory distress.   Skin:     Coloration: Skin is not pale.      Findings: No erythema.   Neurological:      Mental Status: She is alert and oriented to person, place, and time.   Psychiatric:         Speech: Speech normal.         Behavior: Behavior normal.         Thought Content: Thought content normal.         Judgment: Judgment normal.          Assessment / Plan      Assessment/Plan:   Diagnoses and all orders for this visit:    1. Dillard's esophagus without dysplasia  -     esomeprazole (nexIUM) 40 MG capsule; Take 1 capsule by mouth 2 (Two) Times a Day.  Dispense: 240 capsule; Refill: 3  Continue twice daily ppi. Long term side effects discussed.   2. Gastroesophageal reflux disease with esophagitis without hemorrhage  -     esomeprazole (nexIUM) 40 MG capsule; Take 1 capsule by mouth 2 (Two) Times a Day.  Dispense: 240 capsule; Refill: 3         Follow Up:   Return in about 1 year (around 12/4/2024), or if symptoms worsen or fail to improve.    Plan of care reviewed with the patient at the conclusion of today's visit.  Education was provided regarding diagnosis, management, and any prescribed or recommended OTC medications.  Patient verbalized understanding of and agreement with management plan.       YOHANA Curry  [facility]

## 2024-01-08 ENCOUNTER — OFFICE VISIT (OUTPATIENT)
Dept: INTERNAL MEDICINE | Facility: CLINIC | Age: 49
End: 2024-01-08
Payer: COMMERCIAL

## 2024-01-08 VITALS
DIASTOLIC BLOOD PRESSURE: 84 MMHG | SYSTOLIC BLOOD PRESSURE: 110 MMHG | HEIGHT: 64 IN | HEART RATE: 82 BPM | TEMPERATURE: 98.2 F | WEIGHT: 159 LBS | OXYGEN SATURATION: 98 % | BODY MASS INDEX: 27.14 KG/M2

## 2024-01-08 DIAGNOSIS — Z00.00 ENCOUNTER FOR ANNUAL PHYSICAL EXAMINATION EXCLUDING GYNECOLOGICAL EXAMINATION IN A PATIENT OLDER THAN 17 YEARS: Primary | ICD-10-CM

## 2024-01-08 DIAGNOSIS — Z13.0 SCREENING FOR DISORDER OF BLOOD AND BLOOD-FORMING ORGANS: ICD-10-CM

## 2024-01-08 DIAGNOSIS — Z13.0 SCREENING FOR ENDOCRINE, METABOLIC AND IMMUNITY DISORDER: ICD-10-CM

## 2024-01-08 DIAGNOSIS — R74.8 ELEVATED VITAMIN B12 LEVEL: ICD-10-CM

## 2024-01-08 DIAGNOSIS — Z13.228 SCREENING FOR ENDOCRINE, METABOLIC AND IMMUNITY DISORDER: ICD-10-CM

## 2024-01-08 DIAGNOSIS — K22.70 BARRETT'S ESOPHAGUS WITHOUT DYSPLASIA: ICD-10-CM

## 2024-01-08 DIAGNOSIS — L30.9 ECZEMA, UNSPECIFIED TYPE: ICD-10-CM

## 2024-01-08 DIAGNOSIS — E78.5 HYPERLIPIDEMIA, UNSPECIFIED HYPERLIPIDEMIA TYPE: ICD-10-CM

## 2024-01-08 DIAGNOSIS — E55.9 VITAMIN D DEFICIENCY: ICD-10-CM

## 2024-01-08 DIAGNOSIS — R53.83 FATIGUE, UNSPECIFIED TYPE: ICD-10-CM

## 2024-01-08 DIAGNOSIS — Z13.29 SCREENING FOR ENDOCRINE, METABOLIC AND IMMUNITY DISORDER: ICD-10-CM

## 2024-01-08 DIAGNOSIS — I10 PRIMARY HYPERTENSION: ICD-10-CM

## 2024-01-08 DIAGNOSIS — F51.04 PSYCHOPHYSIOLOGICAL INSOMNIA: ICD-10-CM

## 2024-01-08 DIAGNOSIS — K21.00 GASTROESOPHAGEAL REFLUX DISEASE WITH ESOPHAGITIS WITHOUT HEMORRHAGE: ICD-10-CM

## 2024-01-08 DIAGNOSIS — F41.8 DEPRESSION WITH ANXIETY: ICD-10-CM

## 2024-01-08 PROCEDURE — 99396 PREV VISIT EST AGE 40-64: CPT | Performed by: NURSE PRACTITIONER

## 2024-01-08 RX ORDER — BETAMETHASONE DIPROPIONATE 0.5 MG/G
LOTION TOPICAL 2 TIMES DAILY PRN
Qty: 60 ML | Refills: 1 | Status: SHIPPED | OUTPATIENT
Start: 2024-01-08

## 2024-01-08 RX ORDER — DESVENLAFAXINE 100 MG/1
100 TABLET, EXTENDED RELEASE ORAL DAILY
Qty: 90 TABLET | Refills: 3 | Status: SHIPPED | OUTPATIENT
Start: 2024-01-08

## 2024-01-08 NOTE — PROGRESS NOTES
Chief Complaint   Patient presents with    Annual Exam       Phuong Becker is a 48 y.o. female and is here for a comprehensive physical exam.     Depression: She is still taking Pristiq. She would like to increase the dose on this medication. She has not done GeneSight testing before. She has a lot going on in the last year or so. She and her   in 03/2023, although they are in the process of reconciling.  She feels irritable, which is unlike her.  Not currently getting along with her sister well. She has attempted to speak with a therapist in the past but did not find it very helpful. Denies anhedonia, feelings of worthlessness, difficulty concentrating, impaired memory, SI, HI, panic attacks, weight change.    She has not been sleeping well for approximately 2 weeks. She takes magnesium supplements and a half tablet of trazodone every night and had no issues sleeping. Since she has returned from [place] on 12/29/2023, she continues to experience insomnia. She takes magnesium every day because she read that it was good for vestibular migraines. She is not sure if she snores. She has been sleeping by herself for a long time.  Her children tell her she snores only when she is sick.    She has a patch on her left leg, which she believes it may consistent with psoriasis. She has experienced this 2 times in the past. She notes associated pruritis and raised bumps to the area. The area began to heal with scabbing and improved thereafter. This improved for a while; however, her most recent flare was during the summer of 2022. She initially thought she had a rash from a bug bite in [place], but then she ended up with another one on her ankle.     She is following up with Dr. Watts for her Dillard's esophagus. She is still taking Nexium. She saw GI PA in 12/2023.    She was taking vitamin D; however, she has discontinued this. She has not been taking any vitamin B vitamins. She has gained some  weight over the past 2 years. She has dentures. She is not up to date on her eye examinations. She denies any bilateral lower extremity edema.    Menopausal, hysterectomy at age 41. She is compliant with monthly breast exams.   4, para 3  She went to urgent care in [place] because she woke up with left ear pain and blood on a Q-tip. She is not sure if the round of antibiotics improved her symptoms because it never fully resolved. She continues to experienced tinnitus and when she swallows, she hears a clicking sound. She reports decreased hearing for about 12 days.     Hypertension: Takes amlodipine 5 mg as prescribed. Denies chest pain, dyspnea, orthopnea, palpitations, lower extremity edema, confusion, headaches, weakness, visual disturbances.    Health Maintenance   Topic Date Due    LIPID PANEL  2023    BMI FOLLOWUP  2023    COVID-19 Vaccine (3 - 2023- season) 2024 (Originally 2023)    ANNUAL PHYSICAL  2025    TDAP/TD VACCINES (3 - Td or Tdap) 2031    COLORECTAL CANCER SCREENING  2033    HEPATITIS C SCREENING  Completed    INFLUENZA VACCINE  Completed    Pneumococcal Vaccine 0-64  Aged Out    PAP SMEAR  Discontinued       PMH, PSH, SocHx, FamHx, Allergies, and Medications: Reviewed and updated in the Visit Navigator.     Allergies   Allergen Reactions    Penicillins Hives     Past Medical History:   Diagnosis Date    Anemia     Dillard esophagus     Dillard's esophagus without dysplasia 2019    Continue omeprazole daily.     Depression     Diverticulitis of colon     Diverticulosis     Gastroesophageal reflux disease 2018    GERD (gastroesophageal reflux disease)      Past Surgical History:   Procedure Laterality Date    ENDOMETRIAL ABLATION      TOTAL ABDOMINAL HYSTERECTOMY WITH SALPINGO OOPHORECTOMY      UPPER GASTROINTESTINAL ENDOSCOPY       Social History     Socioeconomic History    Marital status:    Tobacco Use    Smoking status: Former     " Packs/day: 0.50     Years: 25.00     Additional pack years: 0.00     Total pack years: 12.50     Types: Cigarettes     Quit date: 2019     Years since quittin.9    Smokeless tobacco: Never    Tobacco comments:     menthol   Vaping Use    Vaping Use: Never used   Substance and Sexual Activity    Alcohol use: Not Currently     Comment: rare    Drug use: No    Sexual activity: Yes     Birth control/protection: Post-menopausal, Surgical     Family History   Problem Relation Age of Onset    SHAHID disease Mother     Hypertension Mother     Hyperlipidemia Mother     Osteoporosis Mother     Migraines Sister     Cervical cancer Maternal Aunt     Ovarian cancer Maternal Aunt     Diabetes Maternal Aunt     Diabetes Maternal Uncle     Diabetes Paternal Aunt     Diabetes Paternal Uncle     Hypertension Paternal Uncle     Heart attack Cousin     Colon cancer Neg Hx     Colon polyps Neg Hx     Esophageal cancer Neg Hx        Review of Systems  Review of Systems   Constitutional:         Weight gain.    Psychiatric/Behavioral:  Positive for sleep disturbance.         Depression.    All other systems reviewed and are negative.      Objective   /84   Pulse 82   Temp 98.2 °F (36.8 °C)   Ht 162.6 cm (64.02\")   Wt 72.1 kg (159 lb)   SpO2 98%   BMI 27.28 kg/m²   Body mass index is 27.28 kg/m².    Physical Exam  Constitutional:       Appearance: She is well-developed. She is not ill-appearing.   HENT:      Head: Normocephalic.      Right Ear: Tympanic membrane, ear canal and external ear normal.      Left Ear: Tympanic membrane, ear canal and external ear normal.      Nose: Nose normal.      Mouth/Throat:      Mouth: Mucous membranes are moist.      Pharynx: Oropharynx is clear. Uvula midline.   Eyes:      Extraocular Movements: Extraocular movements intact.      Conjunctiva/sclera: Conjunctivae normal.      Pupils: Pupils are equal, round, and reactive to light.   Neck:      Thyroid: No thyromegaly.   Cardiovascular: "      Rate and Rhythm: Normal rate and regular rhythm.      Pulses:           Radial pulses are 2+ on the right side and 2+ on the left side.        Dorsalis pedis pulses are 2+ on the right side and 2+ on the left side.      Heart sounds: Normal heart sounds.   Pulmonary:      Effort: Pulmonary effort is normal.      Breath sounds: Normal breath sounds.   Abdominal:      General: Bowel sounds are normal.      Palpations: Abdomen is soft.      Tenderness: There is no abdominal tenderness.   Musculoskeletal:         General: No tenderness or deformity. Normal range of motion.      Cervical back: Full passive range of motion without pain, normal range of motion and neck supple.      Right lower leg: No edema.      Left lower leg: No edema.   Lymphadenopathy:      Cervical: No cervical adenopathy.   Skin:     General: Skin is warm.      Capillary Refill: Capillary refill takes less than 2 seconds.      Comments: Patch of dry scaly skin on left lower leg   Neurological:      Mental Status: She is alert and oriented to person, place, and time.      Sensory: No sensory deficit.      Coordination: Coordination normal.      Gait: Gait normal.      Comments: CN II-XII normal   Psychiatric:         Attention and Perception: Attention normal.         Mood and Affect: Mood and affect normal.         Speech: Speech normal.         Behavior: Behavior normal.         Thought Content: Thought content normal.       The 10-year CVD risk score (D'Agostino, et al., 2008) is: 4.1%    Values used to calculate the score:      Age: 48 years      Sex: Female      Diabetic: No      Tobacco smoker: No      Systolic Blood Pressure: 110 mmHg      Is BP treated: Yes      HDL Cholesterol: 40 mg/dL      Total Cholesterol: 203 mg/dL    PHQ-9 Depression Screening  Little interest or pleasure in doing things? 3-->nearly every day   Feeling down, depressed, or hopeless? 1-->several days   Trouble falling or staying asleep, or sleeping too much?  1-->several days   Feeling tired or having little energy? 3-->nearly every day   Poor appetite or overeating? 0-->not at all   Feeling bad about yourself - or that you are a failure or have let yourself or your family down? 3-->nearly every day   Trouble concentrating on things, such as reading the newspaper or watching television? 3-->nearly every day   Moving or speaking so slowly that other people could have noticed? Or the opposite - being so fidgety or restless that you have been moving around a lot more than usual? 0-->not at all   Thoughts that you would be better off dead, or of hurting yourself in some way? 0-->not at all   PHQ-9 Total Score 14   If you checked off any problems, how difficult have these problems made it for you to do your work, take care of things at home, or get along with other people? somewhat difficult      Assessment & Plan   1. Healthy female exam.    2. Patient Counseling: Including but not Limited to the following, when appropriate:  --Nutrition: Stressed importance of moderation in sodium/caffeine intake, saturated fat and cholesterol, caloric balance, sufficient intake of fresh fruits, vegetables, fiber, calcium, iron, and 1 mg of folate supplement per day (for females capable of pregnancy).  --Exercise: Stressed the importance of regular exercise.   --Substance Abuse: Discussed cessation/primary prevention of tobacco, alcohol, or other drug use; driving or other dangerous activities under the influence; availability of treatment for abuse, as indicated based on social history.    --Sexuality: Discussed sexually transmitted diseases, partner selection, use of condoms, avoidance of unintended pregnancy  and contraceptive alternatives.   --Injury prevention: Discussed safety belts, safety helmets, smoke detector, smoking near bedding or upholstery.   --Dental health: Discussed importance of regular tooth brushing, flossing, and dental visits.  --Immunizations reviewed.  --Discussed  benefits of colon cancer screening.  3. Discussed the patient's BMI with her.  The BMI is above average; BMI management plan is completed  4. Return in about 4 weeks (around 2/5/2024) for Next scheduled follow up.  5. Age-appropriate Screening Scheduled    Assessment & Plan   Weight gain.  Her thyroid function was normal in 2023. I will order blood work including fasting lipid panel, vitamin D, vitamin B12, CBC, liver and kidney function. I will also check some thyroid antibodies. She is to return to the office to have fasting blood work performed.     Psoriasis.  She was advised to use emollient cream like CeraVe, Cetaphil, or Aquaphor. I will send in a prescription for hydrocortisone cream. If her symptoms do not improve, she will contact us and we reevaluate her at that time.    Left ear itching.  She was given a prescription for hydrocortisone.    Follow-up  The patient will follow up in 1 month.    Diagnoses and all orders for this visit:    1. Encounter for annual physical examination excluding gynecological examination in a patient older than 17 years (Primary)    2. Primary hypertension  -     Comprehensive Metabolic Panel        - Follow heart healthy diet.  Keep sodium intake < 1500 mg per day.  Avoid processed & fast foods.          - Exercise as tolerated, with a goal of 30 minutes of moderate exercise most days.         - Take medications as prescribed.    3. Dillard's esophagus without dysplasia  4. Gastroesophageal reflux disease with esophagitis without hemorrhage        - Follow-up with gastroenterology as scheduled.  Continue to take medication as prescribed.    5. Psychophysiological insomnia  Assessment & Plan:  She will take a full tablet of the trazodone. I will recheck her CO2 level and if it is still elevated, she may need a sleep study.  6. Depression with anxiety  Assessment & Plan:  Patient's depression is recurrent and is moderate without psychosis. Their depression is currently active  and the condition is improving with treatment. This will be reassessed in 1 month.  F/U as described:patient was prescribed an antidepressant medicine.  Her PHQ-9 score is a little bit high. I will increase her Pristiq to 100 mg. She was given a pamphlet on GeneSight testing. She will let us know if she wants to do the GeneSight. She is advised to contact the office if she would like a referral to a therapist.   Orders:  -     desvenlafaxine (PRISTIQ) 100 MG 24 hr tablet; Take 1 tablet by mouth Daily.  Dispense: 90 tablet; Refill: 3        - Encouraged to take part in daily physical exercise.          - Eat healthy, well balanced diet; avoid sugary foods or beverages        - Continue to abstain from alcohol and drugs        - Ensure good night's sleep by creating calm space in bedroom, avoiding screen time 1-2 hours before bed, no caffeine after 5 pm        - Talk to supportive family and friends, as needed    7. Hyperlipidemia, unspecified hyperlipidemia type  -     Lipid Panel  - Heart healthy diet and daily physical activity     8. Fatigue, unspecified type        - Checking for vitamin D and vitamin B12 deficiencies as well as anemia, thyroid dysfunction    9. Vitamin D deficiency  -     Vitamin D,25-Hydroxy    10. Elevated vitamin B12 level  -     Vitamin B12    11. Screening for disorder of blood and blood-forming organs  -     CBC & Differential    12. Screening for endocrine, metabolic and immunity disorder  -     TSH  -     T4, Free  -     Thyroid Antibodies    13. Eczema, unspecified type  -     betamethasone dipropionate 0.05 % lotion; Apply  topically to the appropriate area as directed 2 (Two) Times a Day As Needed for Irritation or Rash (ear irritation).  Dispense: 60 mL; Refill: 1  - Mild soap when bathing.  Pat dry after tepid shower.    14. BMI 27.0-27.9,adult        - BMI is >= 25 and <30. (Overweight) The following options were offered after discussion;: exercise counseling/recommendations and  nutrition counseling/recommendations                Transcribed from ambient dictation for YOHANA Beauchamp by Whit Borja  01/09/24   12:18 EST    Patient or patient representative verbalized consent to the visit recording.  I have personally performed the services described in this document as transcribed by the above individual, and it is both accurate and complete.

## 2024-01-09 NOTE — ASSESSMENT & PLAN NOTE
She will take a full tablet of the trazodone. I will recheck her CO2 level and if it is still elevated, she may need a sleep study.

## 2024-01-09 NOTE — ASSESSMENT & PLAN NOTE
Patient's depression is recurrent and is moderate without psychosis. Their depression is currently active and the condition is improving with treatment. This will be reassessed in 1 month.  F/U as described:patient was prescribed an antidepressant medicine.'    Her PHQ-9 score is a little bit high. I will increase her Pristiq to 100 mg. She was given a pamphlet on GeneSight testing. She will let us know if she wants to do the GeneSight. She is advised to contact the office if she would like a referral to a therapist.

## 2024-01-20 ENCOUNTER — PATIENT MESSAGE (OUTPATIENT)
Dept: INTERNAL MEDICINE | Facility: CLINIC | Age: 49
End: 2024-01-20
Payer: COMMERCIAL

## 2024-01-20 DIAGNOSIS — L30.9 ECZEMA, UNSPECIFIED TYPE: Primary | ICD-10-CM

## 2024-01-22 NOTE — TELEPHONE ENCOUNTER
From: Phuong Becker  To: Britney Tse  Sent: 1/20/2024 2:39 PM EST  Subject: Question    Who would I see for the patches on my legs? A dermatologist? I’ve found two new ones, so I’m starting to freak a little. Could you make a referral for me please? Thanks a bunch!

## 2024-02-09 ENCOUNTER — OFFICE VISIT (OUTPATIENT)
Dept: INTERNAL MEDICINE | Facility: CLINIC | Age: 49
End: 2024-02-09
Payer: COMMERCIAL

## 2024-02-09 VITALS
HEART RATE: 96 BPM | TEMPERATURE: 98.2 F | OXYGEN SATURATION: 97 % | DIASTOLIC BLOOD PRESSURE: 70 MMHG | SYSTOLIC BLOOD PRESSURE: 118 MMHG | BODY MASS INDEX: 26.82 KG/M2 | WEIGHT: 161 LBS | HEIGHT: 65 IN

## 2024-02-09 DIAGNOSIS — B37.9 ANTIBIOTIC-INDUCED YEAST INFECTION: ICD-10-CM

## 2024-02-09 DIAGNOSIS — H92.03 DISCOMFORT OF BOTH EARS: ICD-10-CM

## 2024-02-09 DIAGNOSIS — T36.95XA ANTIBIOTIC-INDUCED YEAST INFECTION: ICD-10-CM

## 2024-02-09 DIAGNOSIS — H66.001 NON-RECURRENT ACUTE SUPPURATIVE OTITIS MEDIA OF RIGHT EAR WITHOUT SPONTANEOUS RUPTURE OF TYMPANIC MEMBRANE: Primary | ICD-10-CM

## 2024-02-09 DIAGNOSIS — F41.8 DEPRESSION WITH ANXIETY: ICD-10-CM

## 2024-02-09 RX ORDER — DESVENLAFAXINE 100 MG/1
100 TABLET, EXTENDED RELEASE ORAL DAILY
Qty: 90 TABLET | Refills: 3 | Status: SHIPPED | OUTPATIENT
Start: 2024-02-09

## 2024-02-09 RX ORDER — CEFDINIR 300 MG/1
300 CAPSULE ORAL 2 TIMES DAILY
Qty: 20 CAPSULE | Refills: 0 | Status: SHIPPED | OUTPATIENT
Start: 2024-02-09 | End: 2024-02-19

## 2024-02-09 RX ORDER — FLUCONAZOLE 150 MG/1
150 TABLET ORAL
Qty: 2 TABLET | Refills: 0 | Status: SHIPPED | OUTPATIENT
Start: 2024-02-09 | End: 2024-02-15

## 2024-02-09 NOTE — PROGRESS NOTES
Office Visit      Patient Name: Phuong Becker  : 1975   MRN: [id number]     Chief Complaint:    Chief Complaint   Patient presents with    Hypertension    Anxiety    Depression       History of Present Illness: Phuong Becker is a 48 y.o. female who is here for follow up of depression, anxiety, hypertension.     Depression, anxiety: pristiq dose increased from 50 mg to 100 mg last month.  She has been feeling better with the higher dose.  Continues to take trazodone for insomnia, working well. Denies depressed mood, anhedonia, insomnia, hypersomnia, fatigue, feelings of worthlessness, difficulty concentrating, impaired memory, SI, HI, panic attacks, weight change.    HTN: taking amlodipine 5 mg daily.  Blood pressure well controlled.  Denies chest pain, dyspnea, orthopnea, palpitations, lower extremity edema, confusion, headaches, weakness, visual disturbances.    Left ear discharge, yellow-orange.  Has been present for 2 months.  Waxes and wanes.  Earache.  No fever, chills, change in hearing, dizziness, sore throat, nasal congestion.    Subjective      I have reviewed and the following portions of the patient's history were updated as appropriate: past family history, past medical history, past social history, past surgical history and problem list.      Current Outpatient Medications:     desvenlafaxine (PRISTIQ) 100 MG 24 hr tablet, Take 1 tablet by mouth Daily., Disp: 90 tablet, Rfl: 3    amLODIPine (NORVASC) 5 MG tablet, TAKE 1 TABLET DAILY, Disp: 90 tablet, Rfl: 3    betamethasone dipropionate 0.05 % lotion, Apply  topically to the appropriate area as directed 2 (Two) Times a Day As Needed for Irritation or Rash (ear irritation)., Disp: 60 mL, Rfl: 1    esomeprazole (nexIUM) 40 MG capsule, Take 1 capsule by mouth 2 (Two) Times a Day., Disp: 240 capsule, Rfl: 3    fluticasone (Flonase) 50 MCG/ACT nasal spray, 2 sprays into the nostril(s) as directed by provider Daily., Disp: 18.2 mL,  "Rfl: 11    traZODone (DESYREL) 100 MG tablet, Take 1/2 to 1 tablet 30 minutes before bedtime as needed for sleep., Disp: 90 tablet, Rfl: 3    Allergies   Allergen Reactions    Penicillins Hives       Objective     Physical Exam:  Vital Signs:   Vitals:    02/09/24 0953   BP: 118/70   Pulse: 96   Temp: 98.2 °F (36.8 °C)   SpO2: 97%   Weight: 73 kg (161 lb)   Height: 165.1 cm (65\")     Body mass index is 26.79 kg/m².         Physical Exam  Constitutional:       Appearance: She is not ill-appearing.   HENT:      Head: Normocephalic.      Right Ear: Tympanic membrane, ear canal and external ear normal.      Left Ear: Ear canal and external ear normal. Tympanic membrane is erythematous and bulging.   Eyes:      Conjunctiva/sclera: Conjunctivae normal.      Pupils: Pupils are equal, round, and reactive to light.   Cardiovascular:      Rate and Rhythm: Normal rate and regular rhythm.      Heart sounds: Normal heart sounds.   Pulmonary:      Effort: Pulmonary effort is normal.      Breath sounds: Normal breath sounds.   Musculoskeletal:      Cervical back: Normal range of motion and neck supple.      Right lower leg: No edema.      Left lower leg: No edema.   Skin:     General: Skin is warm.      Capillary Refill: Capillary refill takes less than 2 seconds.   Neurological:      Mental Status: She is alert and oriented to person, place, and time.      Coordination: Coordination normal.      Gait: Gait normal.   Psychiatric:         Mood and Affect: Mood normal.         Behavior: Behavior normal.         Thought Content: Thought content normal.             Assessment / Plan      Assessment/Plan:   Diagnoses and all orders for this visit:    1. Non-recurrent acute suppurative otitis media of right ear without spontaneous rupture of tympanic membrane (Primary)  -     cefdinir (OMNICEF) 300 MG capsule; Take 1 capsule by mouth 2 (Two) Times a Day for 10 days.  Dispense: 20 capsule; Refill: 0  - Warm compress to ear prn " pain   - Acetaminophen or ibuprofen, per package directions, as needed for mild pain    2. Antibiotic-induced yeast infection  -     fluconazole (Diflucan) 150 MG tablet; Take 1 tablet by mouth Every 3 (Three) Days for 6 days.  Dispense: 2 tablet; Refill: 0    3. Discomfort of both ears  -     Ambulatory Referral to ENT (Otolaryngology)    4. Depression with anxiety  -     desvenlafaxine (PRISTIQ) 100 MG 24 hr tablet; Take 1 tablet by mouth Daily.  Dispense: 90 tablet; Refill: 3        - Encouraged to take part in daily physical exercise.          - Eat healthy, well balanced diet; avoid sugary foods or beverages        - Continue to abstain from alcohol and drugs        - Ensure good night's sleep by creating calm space in bedroom, avoiding screen time 1-2 hours before bed, no caffeine after 5 pm        - Talk to supportive family and friends, as needed        Follow Up:   Return in about 11 months (around 1/9/2025) for Annual.    Patient was given instructions and counseling regarding her condition or for health maintenance advice. Please see specific information pulled into the AVS if appropriate.       [facility]     Please note that portions of this note may have been completed with a voice recognition program. Efforts were made to edit dictation, but occasionally words are mistranscribed.

## 2024-02-12 LAB
25(OH)D3+25(OH)D2 SERPL-MCNC: 35.8 NG/ML (ref 30–100)
ALBUMIN SERPL-MCNC: 4.4 G/DL (ref 3.5–5.2)
ALBUMIN/GLOB SERPL: 1.5 G/DL
ALP SERPL-CCNC: 176 U/L (ref 39–117)
ALT SERPL-CCNC: 22 U/L (ref 1–33)
AST SERPL-CCNC: 20 U/L (ref 1–32)
BASOPHILS # BLD AUTO: 0.06 10*3/MM3 (ref 0–0.2)
BASOPHILS NFR BLD AUTO: 0.7 % (ref 0–1.5)
BILIRUB SERPL-MCNC: 0.3 MG/DL (ref 0–1.2)
BUN SERPL-MCNC: 9 MG/DL (ref 6–20)
BUN/CREAT SERPL: 11.7 (ref 7–25)
CALCIUM SERPL-MCNC: 9.2 MG/DL (ref 8.6–10.5)
CHLORIDE SERPL-SCNC: 100 MMOL/L (ref 98–107)
CHOLEST SERPL-MCNC: 192 MG/DL (ref 0–200)
CO2 SERPL-SCNC: 28.6 MMOL/L (ref 22–29)
CREAT SERPL-MCNC: 0.77 MG/DL (ref 0.57–1)
EGFRCR SERPLBLD CKD-EPI 2021: 96.5 ML/MIN/1.73
EOSINOPHIL # BLD AUTO: 0.34 10*3/MM3 (ref 0–0.4)
EOSINOPHIL NFR BLD AUTO: 3.9 % (ref 0.3–6.2)
ERYTHROCYTE [DISTWIDTH] IN BLOOD BY AUTOMATED COUNT: 13.4 % (ref 12.3–15.4)
GLOBULIN SER CALC-MCNC: 3 GM/DL
GLUCOSE SERPL-MCNC: 93 MG/DL (ref 65–99)
HCT VFR BLD AUTO: 42.9 % (ref 34–46.6)
HDLC SERPL-MCNC: 39 MG/DL (ref 40–60)
HGB BLD-MCNC: 15 G/DL (ref 12–15.9)
IMM GRANULOCYTES # BLD AUTO: 0.01 10*3/MM3 (ref 0–0.05)
IMM GRANULOCYTES NFR BLD AUTO: 0.1 % (ref 0–0.5)
LDLC SERPL CALC-MCNC: 109 MG/DL (ref 0–100)
LYMPHOCYTES # BLD AUTO: 3.62 10*3/MM3 (ref 0.7–3.1)
LYMPHOCYTES NFR BLD AUTO: 41.5 % (ref 19.6–45.3)
MCH RBC QN AUTO: 28.4 PG (ref 26.6–33)
MCHC RBC AUTO-ENTMCNC: 35 G/DL (ref 31.5–35.7)
MCV RBC AUTO: 81.3 FL (ref 79–97)
MONOCYTES # BLD AUTO: 0.8 10*3/MM3 (ref 0.1–0.9)
MONOCYTES NFR BLD AUTO: 9.2 % (ref 5–12)
NEUTROPHILS # BLD AUTO: 3.89 10*3/MM3 (ref 1.7–7)
NEUTROPHILS NFR BLD AUTO: 44.6 % (ref 42.7–76)
NRBC BLD AUTO-RTO: 0 /100 WBC (ref 0–0.2)
PLATELET # BLD AUTO: 286 10*3/MM3 (ref 140–450)
POTASSIUM SERPL-SCNC: 3.6 MMOL/L (ref 3.5–5.2)
PROT SERPL-MCNC: 7.4 G/DL (ref 6–8.5)
RBC # BLD AUTO: 5.28 10*6/MM3 (ref 3.77–5.28)
SODIUM SERPL-SCNC: 139 MMOL/L (ref 136–145)
T4 FREE SERPL-MCNC: 1.13 NG/DL (ref 0.93–1.7)
THYROGLOB AB SERPL-ACNC: <1 IU/ML (ref 0–0.9)
THYROPEROXIDASE AB SERPL-ACNC: 12 IU/ML (ref 0–34)
TRIGL SERPL-MCNC: 253 MG/DL (ref 0–150)
TSH SERPL DL<=0.005 MIU/L-ACNC: 1.95 UIU/ML (ref 0.27–4.2)
VIT B12 SERPL-MCNC: 784 PG/ML (ref 211–946)
VLDLC SERPL CALC-MCNC: 44 MG/DL (ref 5–40)
WBC # BLD AUTO: 8.72 10*3/MM3 (ref 3.4–10.8)

## 2024-04-01 ENCOUNTER — E-VISIT (OUTPATIENT)
Dept: FAMILY MEDICINE CLINIC | Facility: TELEHEALTH | Age: 49
End: 2024-04-01

## 2024-04-01 PROCEDURE — BRIGHTMDVISIT: Performed by: NURSE PRACTITIONER

## 2024-04-01 NOTE — EXTERNAL PATIENT INSTRUCTIONS
Diagnosis   Bacterial sinusitis   My name is YOHANA Carpenter, and I'm a healthcare provider at [facility]. I reviewed your interview, and I see that you have bacterial sinusitis, also known as a bacterial sinus infection.   Medications   Your pharmacy   [id number] [address] [place] [id number] [phone]     Prescription   Doxycycline monohydrate (100mg): Take 1 tablet by mouth twice a day for 7 days for infection. This medication is an antibiotic. Take it exactly as directed. You must finish the entire course of medication, even if you feel better after the first few days of treatment.    Start taking the antibiotics I've prescribed right away. You need to finish the entire course of antibiotics, even if you start to feel better before the pills run out.   Non-prescription   Mucus Relief ER oral tablet, extended release (600mg): Take 1 tablet by mouth every 12 hours as needed for cough and congestion.   Fluticasone (50mcg): Spray 2 times in each nostril once daily for nasal symptoms due to allergies or sinusitis. Fluticasone needs to be used every day to be effective. It may take up to a week for the full effects of the medication to be seen. Brands to look for include Flonase.    Some people develop a yeast infection after taking antibiotics. If you get a yeast infection, you can treat it with antifungal creams or suppositories. These are available without a prescription at [facility] and many supermarkets.   About your diagnosis   The sinuses are hollow spaces connected to the nasal passages. Sinusitis occurs when the sinuses swell and block the drainage of fluid and mucus from the nose, causing pain, pressure, and congestion. Fatigue, difficulty sleeping, or decreased appetite may accompany your symptoms.   More than 90% of sinus infections are caused by viruses. However, in certain cases, a sinus infection may be caused by bacteria. Bacterial sinus infections usually look like  one of the following cases:    Severe sinus symptoms with a fever over 102F.    Sinus symptoms that have not improved at all after 10 days.    Cold symptoms that slowly improve but then worsen again after 5 or 6 days, usually with a high fever, headache, or nasal discharge.   What to expect   If you follow this treatment plan, you should start to feel better within a few days.   When to seek care   Call us at [phone]   with any sudden or unexpected symptoms.    Symptoms that last longer than 10 to 14 days.    Symptoms that get better for a few days, and then suddenly get worse.    Fever that measures over 103F or continues for more than 3 days.    Any vision changes.    A worsening headache.    Stiff neck.    Swelling of your forehead or eyes.    Coughing up red or bloody mucus.    Swallowing becomes extremely difficult or impossible.    More than 5 episodes of diarrhea in a day.    More than 5 episodes of vomiting in a day.    Severe shortness of breath.    Severe chest pain   Other treatment    Rest! Your body needs rest to recover and fight infection.    Drink plenty of water to stay hydrated.    Use steam to soothe your sinuses: Breathe it in from a shower or a bowl of hot water. Placing a warm, moist washcloth over your nose and forehead may help relieve the sinus pain and pressure.    Try non-prescription saline nasal sprays to help your nasal symptoms. Try using a Neti Pot to flush out your stuffy nose and sinuses. Neti Pots are available at any drugstore without a prescription.    Avoid smoke and air pollution. Smoke can make infections worse.   Prevention    Avoid close contact with other people when you're sick.    Cover your mouth and nose when you cough or sneeze. Use a tissue or cough into your elbow. Make sure that used tissues go directly into the trash.    Avoid touching your eyes, nose, or mouth while you're sick.    Wash your hands often, especially after coughing, sneezing, or blowing  your nose. If soap and water are not available, use an alcohol-based hand .    If you or someone in your home or workplace is sick, disinfect commonly used items. This includes door handles, tables, computers, remotes, and pens.    Coronavirus (COVID-19) information   Common symptoms of COVID-19 include fever, cough, shortness of breath, fatigue, muscle or body aches, headaches, new loss of sense of taste or smell, sore throat, stuffy or runny nose, nausea or vomiting, and diarrhea. Most people who get COVID-19 have mild symptoms and can rest at home until they get better. Elderly people and those with chronic medical problems may be at risk for more serious complications.   FAQs about the COVID-19 vaccine   Are the vaccines safe?   Yes. Hundreds of millions of people in the [place] have already safely received COVID-19 vaccines under the most intense safety monitoring in the history of the [place].   Do I need the vaccine if I've already had COVID?   Yes. Vaccination helps protect you even if you've already had COVID.   If you had COVID-19 and had symptoms, wait to get vaccinated until you've recovered and completed your isolation period.   If you tested positive for COVID-19 but did not have symptoms, you can get vaccinated after 5 full days have passed since you had a positive test, as long as you don't develop symptoms.   How many doses of the vaccine do I need?   Visit www.cdc.gov/coronavirus/2019-ncov/vaccines/stay-up-to-date.html   to find out how to stay up to date with your COVID-19 vaccines.   I'm immunocompromised. How many doses of the vaccine do I need?   For information on how immunocompromised people can stay up to date with their COVID-19 vaccines, visit www.cdc.gov/coronavirus/2019-ncov/vaccines/recommendations/immuno.html  .   What are the common side effects of the vaccine?   A sore arm, tiredness, headache, and muscle pain may occur within two days of getting the vaccine and last a day or two. For  the Moderna or Pfizer vaccines, side effects are more common after the second dose. People over the age of 55 are less likely to have side effects than younger people.   After I'm up to date on vaccines, can I still get or spread COVID?   Yes, you can still get COVID, but your disease should be milder. And your risk of serious illness, hospitalization, and complications will be much lower, especially if you're up to date. Unfortunately, you can still spread COVID if you've been vaccinated. That's why it's important to follow isolation guidelines if you get sick or test positive.   After I'm up to date on vaccines, can I go back to normal?   You should still wear a mask indoors in public if:    It's required by laws, rules, regulations, or local guidance.    You have a weakened immune system.    Your age puts you at increased risk of severe disease.    You have a medical condition that puts you at increased risk of severe disease.    Someone in your household has a weakened immune system, is at increased risk for severe disease, or is unvaccinated.    You're in an area of high transmission.   Where can I get a COVID-19 vaccine?   Visit [facility]'s website for more information. To find a COVID-19 vaccination site near you, visit www.vaccines.gov/  , call [phone]  , or text your zip code to [phone] ([employer]). Message and data rates may apply.   I've had close contact with someone who has COVID. Do I need to quarantine, and if so, for how long?   For the most current answer, including a calculator to determine whether you need to stay home and for how long, visit www.cdc.gov/coronavirus/2019-ncov/your-health/isolation.html  .   I've tested positive for COVID. How long do I need to isolate?   For the latest recommendations, including a calculator to determine how long you need to stay home, visit www.cdc.gov/coronavirus/2019-ncov/your-health/isolation.html  .   What if I develop symptoms that might be from  COVID?   For the latest recommendations on what to do if you're sick, including when to seek emergency care, visit www.cdc.gov/coronavirus/2019-ncov/if-you-are-sick/index.html  .    Flu vaccine information   Who should get a flu vaccine?   Everyone 6 months of age and older should get a yearly flu vaccine.   When should I get vaccinated?   You should get a flu vaccine by the end of October. Once you're vaccinated, it takes about two weeks for antibodies to develop and protect you against the flu. That's why it's important to get vaccinated as soon as possible.   After October, is it too late to get vaccinated?   No. You should still get vaccinated. As long as the flu viruses are still in your community, flu vaccines will remain available, even into January of next year or later.   Why do I need a flu vaccine EVERY year?   Flu viruses are constantly changing, so flu vaccines are usually updated from one season to the next. Your protection from the flu vaccine also lessens over time.   Is the flu vaccine safe?   Yes. Over the last 50 years, hundreds of millions of Americans have safely received the flu vaccines.   What are the side effects of flu vaccines?   You CANNOT get the flu from a flu vaccine. Common side effects of the flu shot include soreness, redness and/or swelling where the shot was given, low grade fever, and aches. Common side effects of the nasal spray flu vaccine for adults include runny nose, headaches, sore throat, and cough. For children, side effects include wheezing, vomiting, muscle aches, and fever.   Does the flu vaccine increase your risk of getting COVID-19?   No. There is no evidence that getting a flu vaccine increases your risk of getting COVID-19.   Is it safe to get the flu vaccine along with a COVID-19 vaccine?   Yes. It's safe to get the flu vaccine with a COVID-19 vaccine or booster.   Contact your healthcare provider TODAY for details on when and where to get your flu vaccine.    Your provider   Your diagnosis was provided by YOHANA Carpenter, a member of your trusted care team at [facility].   If you have any questions, call us at [phone]  .

## 2024-04-01 NOTE — E-VISIT TREATED
Chief Complaint: Cold, flu, COVID, sinus, hay fever, or seasonal allergies   Patient introduction   Patient is 48-year-old female with congestion that started 6 to 9 days ago.   COVID-19 testing history, vaccination status, and exposure:    Patient was tested for COVID-19 within the last week. Patient specifies date of test as 03/26/2024. Test result was negative.    Patient was prompted to take a self-test during the interview, but does not have a COVID-19 test kit.    Has not received an updated [id number] COVID-19 vaccination (Pfizer-BioNTech or Moderna vaccine after September 12, 2023; or Novavax vaccine after October 3, 2023).    No known exposure to a person with a confirmed or suspected case of COVID-19.    No high-risk (household) exposure to COVID-19 within the last 14 days.   Risk factors for severe disease from COVID-19 infection    Higher risk for severe disease from COVID-19 because of BMI >= 25.    Mostly sedentary lifestyle.    Hypertension.   Warning. The following may warrant further investigation:    Hypertension.   General presentation   No improvement of symptoms. Symptoms came on gradually.   Fever:    No fever.   Sinus and nasal symptoms:    Green nasal drainage.    Nasal drainage is thick.    1 to 3 episodes of antibiotic treatment for sinus infection in the last year.    Sinus pain or pressure on or around the cheeks.    Patient first noticed sinus pain 5 to 9 days ago.    Sinus pain is not worse with Valsalva.    No nasal discharge.    No itchy nose or sneezing.    No postnasal drip.    No history of unhealed nasal septal ulcer/nasal wound.    No history of deviated septum or nasal polyps.   Throat symptoms:    No sore throat.   Head and body aches:    No headache.    No sweats.    No chills.    No myalgia.    No fatigue.   Cough:    No cough.   Wheezing and shortness of breath:    No COPD diagnosis.    No asthma diagnosis.    No wheezing.    No shortness of breath.   Chest pain:    No  "chest pain.   Ear symptoms:    Current symptoms include pressure, fullness, and a plugged or blocked sensation in the ear(s).   Dizziness:    No dizziness.   Allergies:    No history of allergies.   Flu exposure:    No recent known exposure to a person with a confirmed flu diagnosis.    Received a flu vaccine in the last 3 to 6 months.   Not taking any over-the-counter medications for current symptoms.   Review of red flags/alarm symptoms:    No changes in alertness or awareness.    No decreased urination.    No blue or gray coloring present in face, lips, or nail beds.    No swelling, pain, redness, or increased warmth in the calf or lower part of ONE leg only.    No proptosis.   Pregnancy/menstrual status/breastfeeding:    Patient is postmenopausal.   Self-exam:    Height: 5' 5\"    Weight: 162 lbs    Neck lymph nodes feel normal.   Recent antibiotic use:    Has not taken antibiotics for similar symptoms within the past month.   Current medications   Currently taking desvenlafaxine 100 MG 24 hr tablet, esomeprazole 40 MG capsule, traZODone 100 MG tablet, amLODIPine 5 MG tablet, and fluticasone 50 MCG/ACT nasal spray.   Medication allergies    Penicillins (reaction: raised, red, itchy spots with each spot lasting less than 24 hours)   Medication contraindication review   Patient has history of hypertension and depression. Therefore, the following medication(s) will not be prescribed:    Metoclopramide.    Acetaminophen-diphenhydramine-phenylephrine.    Pseudoephedrine.    Aspirin-chlorpheniramine-phenylephrine.   No history of metoclopramide-associated dystonic reaction and tardive dyskinesia.   No known history of azithromycin-associated cholestatic jaundice or hepatic impairment.   Past medical history   Immune conditions:   No immunocompromising conditions.   No history of cancer.   Social history   Never smoked tobacco.   Patient did not request an excuse note.   Assessment   Bacterial sinusitis.   This is the " likely diagnosis based on patient's interview responses, including:    Symptom profile    Duration of symptoms longer than 5 days    Symptoms have not improved   Plan   Medications:    doxycycline monohydrate 100 mg tablet RX 100mg 1 tab PO bid 7d for infection. This medication is an antibiotic. Take it exactly as directed. You must finish the entire course of medication, even if you feel better after the first few days of treatment. Amount is 14 tab.    Mucus Relief  mg tablet, extended release OTC 600mg 1 tab PO q12h PRN 7d for cough and congestion. Amount is 14 tab.    fluticasone 50 mcg/actuation nasal spray,suspension OTC 50mcg 2 sprays each nostril nasal qd 30d for nasal symptoms due to allergies or sinusitis. Fluticasone needs to be used every day to be effective. It may take up to a week for the full effects of the medication to be seen. Brands to look for include Flonase. Amount is 16 g.   The patient's prescription will be sent to:   [id number]   [address] [place] [address]   Phone: [phone]     Fax: [phone]   Patient informed to purchase OTC medications.   Education:    Condition and causes    Prevention    Treatment and self-care    When to call provider   ----------   Electronically signed by YOHANA Carpenter on 2024-04-01 at 12:08PM   ----------   Patient Interview Transcript:   Which of these symptoms are bothering you? Select all that apply.    Stuffed-up nose or sinuses   Not selected:    Cough    Shortness of breath    Runny nose    Itchy or watery eyes    Itchy nose or sneezing    Loss of smell or taste    Sore throat    Hoarse voice or loss of voice    Headache    Fever    Sweats    Chills    Muscle or body aches    Fatigue or tiredness    Nausea or vomiting    Diarrhea    I don't have any of these symptoms   When did your current symptoms start? Select one.    6 to 9 days ago   Not selected:    Less than 48 hours ago    3 to 5 days ago    10 to 14 days  "ago    2 to 3 weeks ago    3 to 4 weeks ago    More than a month ago   Did your symptoms come on suddenly or gradually? Select one.    Gradually   Not selected:    Suddenly    I'm not sure   Have your symptoms improved at all since they began? Select one.    No   Not selected:    Yes, but they haven't gone away completely    Yes, but then they came back worse than before   Since your current symptoms started, have you been tested for COVID-19? This includes home self-tests as well as nose swab or saliva tests done at a doctor's office, lab, or testing site. Select one.    Yes   Not selected:    No   When was your most recent COVID-19 test? Select one.    Within the last week (specify date as MM/DD/YY): 03/26/2024   Not selected:    Within the last 24 hours    7 to 14 days ago    15 to 30 days ago    More than 1 month ago   What was the result of your most recent COVID-19 test? Select one.    Negative   Not selected:    Positive   Even though your last test was negative, you could still have COVID. You may have tested before the virus was detectable. In some cases, repeat testing over several days is needed. - If you have a COVID test kit, take the test now before continuing this interview. - If you choose not to take a test or don't have one, you should still continue this interview. Your provider can still help you get care. Do you have a COVID test kit? Select one.    No, I don't have a test kit   Not selected:    Yes, and I'll take another test now    Yes, but I prefer not to take a test now   Has anyone in your household tested positive for COVID-19 in the past 14 days? Select one.    No   Not selected:    Yes   In the last 14 days, have you had close contact with someone who has COVID-19? \"Close contact\" means any of these: - Caring for someone with COVID-19. - Being within 6 feet of someone with COVID-19 for a total of at least 15 minutes over a 24-hour period. For example, three 5-minute exposures for a total " "of 15 minutes. - Being in direct contact with respiratory droplets from someone with COVID-19 (being coughed on, kissing, sharing utensils). Select one.    No, not that I know of   Not selected:    Yes, a confirmed case    Yes, a suspected case   Have you gotten the [id number] updated COVID-19 vaccine? This means either the updated Pfizer-PodPonics or Moderna vaccine after September 12, 2023; or the updated Novavax vaccine after October 3, 2023. Select one.    No   Not selected:    Yes   Since your symptoms started, have you felt dizzy? Select one.    No   Not selected:    Yes, but I can still do my regular daily activities    Yes, and it makes it hard to stand, walk, or do daily activities   Do you have chest pain? You might also feel it as discomfort, aching, tightness, or squeezing in the chest. Select one.    No   Not selected:    Yes   Do you feel sinus pain or pressure in any of these areas?    In my cheeks   Not selected:    In my forehead    Around my eyes    Behind my nose    In my upper teeth or jaw    No   When did you first notice your sinus pain or pressure? Select one.    5 to 9 days ago   Not selected:    Less than 5 days ago    10 to 14 days ago    2 to 4 weeks ago    1 month ago or longer   Does coughing, sneezing, or leaning forward make your sinuses feel worse? Select one.    No   Not selected:    Yes   What color is your nasal drainage? Select one.    Green or greenish   Not selected:    Clear    White    Yellow or yellowish    My nose is stuffed but not draining or running   Is your nasal drainage thick or thin? Select one.    Thick   Not selected:    Thin   Is there any drainage (mucus) going down the back of your throat? This kind of drainage is also called \"postnasal drip.\" It can cause frequent throat clearing. Select one.    No, not that I know of   Not selected:    Yes   Have you urinated at least 3 times in the last 24 hours? Select one.    Yes   Not selected:    No   Do your face, lips, or " nail beds appear blue or gray? Select one.    No   Not selected:    Yes   Do you have any swelling, pain, redness, or increased warmth in the calf or lower part of ONE leg only? Select one.    No   Not selected:    Yes   Changes in alertness or awareness may mean you need emergency care. Since your symptoms started, have you had any of these? Select all that apply.    None of the above   Not selected:    Confusion    Slurred speech    Not knowing where you are or what day it is    Difficulty staying conscious    Fainting or passing out   Do your symptoms include a whistling sound, or wheezing, when you breathe? Select one.    No   Not selected:    Yes   Since your symptoms started, have you noticed that one or both of your eyes is bulging or poking out? Select one.    No   Not selected:    Yes   Do you have any of these symptoms in your ear(s)? Select all that apply.    Pressure    Fullness    Plugged or blocked sensation   Not selected:    Pain    Crackling or popping    None of the above   Are your glands/lymph nodes swollen, or does it hurt when you touch them?    No, not that I can tell   Not selected:    Yes   In the past week, has anyone around you (such as at school, work, or home) had a confirmed diagnosis of the flu? A confirmed diagnosis means that a nose swab was done to verify a flu infection. Select all that apply.    No, not that I know of   Not selected:    I live with someone who has the flu    I've been within touching distance of someone who has the flu    I've walked by, or sat about 3 feet away from, someone who has the flu    I've been in the same building as someone who has the flu   Have you ever been diagnosed with asthma? Select one.    No   Not selected:    Yes   Have you ever been diagnosed with chronic obstructive pulmonary disease (COPD)? Select one.    No, not that I know of   Not selected:    Yes   In the past month, have you taken antibiotics for similar symptoms? Examples of  antibiotics include amoxicillin, amoxicillin-clavulanate (Augmentin), penicillin, cefdinir (Omnicef), doxycycline, and clindamycin (Cleocin). Select one.    No   Not selected:    Yes (specify)   In the last year, how many times were you treated with antibiotics for a sinus infection? Select one.    1 to 3 times   Not selected:    None    4 or more times   Do any of these apply to you? Select all that apply.    None of the above   Not selected:    I've been hospitalized within the last 5 days    I have diabetes    I'm in close contact with a child in    Have you been diagnosed with a deviated septum or nasal polyps? The nose is divided into two nostrils by the septum. A crooked septum is called a deviated septum. Nasal polyps are growths inside the nose or sinuses. Select one.    No, not that I know of   Not selected:    Yes, but I had surgery to treat them    Yes, I have a deviated septum    Yes, I have nasal polyps    Yes, I have a deviated septum and nasal polyps   Do you have a sore inside your nose that won't heal? Select one.    No, not that I know of   Not selected:    Yes   Do you have allergies (pollen, dust mites, mold, animal dander)? Select one.    No, not that I know of   Not selected:    Yes   Have you had a flu shot this season? Select one.    Yes, 3 to 6 months ago   Not selected:    Yes, less than 2 weeks ago    Yes, 2 to 4 weeks ago    Yes, 1 to 3 months ago    Yes, more than 6 months ago    No   Have you gone through menopause? Select one.    Yes   Not selected:    No    I'm going through it now   The flu and COVID-19 can be more serious for people in certain groups. The next few questions help us figure out if you or anyone you live with is at higher risk for complications from these infections. Do any of these statements apply to you? Select all that apply.    None of the above   Not selected:    I'm     I'm     I'm Black    I'm  or    Do you smoke  tobacco? Select one.    No   Not selected:    Yes, every day    Yes, some days    No, I quit   Do you have a mostly inactive lifestyle? Answer yes if all of these are true: - You spend at least 6 hours a day sitting or lying down - You get less than 2 and a half hours per week of moderate exercise such as walking fast - You get less than 1 hour and 15 minutes per week of intense exercise such as jogging or running Select one.    Yes   Not selected:    No   Do you have any of these conditions? Select all that apply.    None of the above   Not selected:    Chronic lung disease, such as cystic fibrosis or interstitial fibrosis    Heart disease, such as congenital heart disease, congestive heart failure, or coronary artery disease    Disorder of the brain, spinal cord, or nerves and muscles, such as dementia, cerebral palsy, epilepsy, muscular dystrophy, or developmental delay    Metabolic disorder or mitochondrial disease    Cerebrovascular disease, such as stroke or another condition affecting the blood vessels or blood supply to the brain    Down syndrome    Mood disorder, including depression or schizophrenia spectrum disorders    Substance use disorder, such as alcohol, opioid, or cocaine use disorder    Tuberculosis    Primary immunodeficiency   Do you live in a group care setting? Examples include: - Nursing home - Residential care - Psychiatric treatment facility - Group home - [facility] - [facility] and care home - Homeless shelter - Foster care setting Select one.    No   Not selected:    Yes   Are you a healthcare worker? Select one.    No   Not selected:    Yes   People with a very high body mass index (BMI) are at higher risk for developing complications from the flu and severe illness from COVID-19. To determine your BMI, we need to know your weight and height. Please enter your weight (in pounds).    Weight   Please enter your height.    Height   Do you have any of these conditions that can affect the immune  system? Scroll to see all options. Select all that apply.    None of these   Not selected:    History of bone marrow transplant    Chronic kidney disease    Chronic liver disease (including cirrhosis)    HIV/AIDS    Inflammatory bowel disease (Crohn's disease or ulcerative colitis)    Lupus    Moderate to severe plaque psoriasis    Multiple sclerosis    Rheumatoid arthritis    Sickle cell anemia    Alpha or beta thalassemia    History of kidney, liver, heart, or other solid organ transplant    History of liver, heart, or other solid organ transplant    History of spleen removal    An autoimmune disorder not listed here (specify)    A condition requiring treatment with long-term use of oral steroids (such as prednisone, prednisolone, or dexamethasone) (specify)   Have you ever been diagnosed with cancer? Select one.    No   Not selected:    Yes, I have cancer now    Yes, but I'm in remission   The flu and COVID-19 can be more serious for people in certain groups. Do any of these apply to the people who live with you? Select all that apply.    None of the above   Not selected:    Under age 5    Over age 65            Black     or     Pregnant    Has given birth, had a miscarriage, had a pregnancy loss, or had an  in the last 2 weeks   Does any member of your household have any of these medical conditions? Select all that apply.    None of the above   Not selected:    Asthma    Disorders of the brain, spinal cord, or nerves and muscles, such as dementia, cerebral palsy, epilepsy, muscular dystrophy, or developmental delay    Chronic lung disease, such as COPD or cystic fibrosis    Heart disease, such as congenital heart disease, congestive heart failure, or coronary artery disease    Cerebrovascular disease, such as stroke or another condition affecting the blood vessels or blood supply to the brain    Blood disorders, such as sickle cell disease    Diabetes     Metabolic disorders such as inherited metabolic disorders or mitochondrial disease    Kidney disorders    Liver disorders    Weakened immune system due to illness or medications such as chemotherapy or steroids    Children under the age of 19 who are on long-term aspirin therapy    Extreme obesity (BMI > 40)   Do you have any of these conditions? Scroll to see all options. Select all that apply.    Depression    High blood pressure   Not selected:    Aspirin triad (also known as Samter's triad or ASA triad)    Asthma or hives from taking aspirin or other NSAIDs, such as ibuprofen or naproxen    Blockage or narrowing of the blood vessels of the heart    Blood clotting disorder    Blood dyscrasia, such anemia, leukemia, lymphoma, or myeloma    Bone marrow depression    Catecholamine-releasing paraganglioma    Congenital long QT syndrome    Difficulty urinating or completely emptying your bladder    Uncorrected electrolyte abnormalities    Fungal infection    Gastrointestinal (GI) bleeding    Gastrointestinal (GI) obstruction    G6PD deficiency    Recent heart attack    Irregular heartbeat or heart rhythm    Mononucleosis (mono)    Myasthenia gravis    Parkinson's disease    Pheochromocytoma    Reye syndrome    Seizure disorder    Thyroid disease    Ulcerative colitis    None of the above   Have you ever had either of these conditions? Select all that apply.    No   Not selected:    Metoclopramide-associated dystonic reaction    Tardive dyskinesia   Just a few more questions about medications, and then you're finished. Have you used any non-prescription medications or nasal sprays for your current symptoms? Examples include saline sprays, decongestants, NyQuil, and Tylenol. Select one.    No   Not selected:    Yes   Have you taken any monoamine oxidase inhibitor (MAOI) medications in the last 14 days? Examples include rasagiline (Azilect), selegiline (Eldepryl, Zelapar), isocarboxazid (Marplan), phenelzine (Nardil),  "and tranylcypromine (Parnate). Select one.    No, not that I know of   Not selected:    Yes   Do you take Kynmobi or Apokyn (apomorphine)? Select one.    No   Not selected:    Yes   Are you still taking these medications listed in your medical record? If you're not taking any of these, click Next. Select all that apply.    desvenlafaxine 100 MG 24 hr tablet    esomeprazole 40 MG capsule    traZODone 100 MG tablet    amLODIPine 5 MG tablet    fluticasone 50 MCG/ACT nasal spray   Are you taking any other medications, vitamins, or supplements? Select one.    No   Not selected:    Yes   Have you ever had an allergic or bad reaction to any medication? Select one.    Yes   Not selected:    No   Have you had an allergic or bad reaction to any of these medications? Select all that apply.    No, not that I know of   Not selected:    Baloxavir (Xofluza)    Benzonatate (Tessalon Perles)    Fluconazole, itraconazole, or terconazole (brands include Diflucan, Sporanox, Terazol)    Oseltamivir (Tamiflu) or zanamivir (Relenza)    Paxlovid, nirmatrelvir, or ritonavir (Norvir)   Have you had an allergic or bad reaction to any of these antibiotic medications? Select all that apply.    Penicillin or any \"-cillin\" antibiotic, such as amoxicillin, ampicillin, dicloxacillin, nafcillin, or piperacillin (Brands include Augmentin, Unasyn, and Zosyn)   Not selected:    Tetracycline or any \"-cycline\" antibiotic, such as doxycycline, demeclocycline, minocycline (Brands include Declomycin, Doryx, Dynacin, Oracea, Monodox, Panmycin, and Vibramycin)    Ciprofloxacin or any \"-floxacin\" antibiotic, such as gemifloxacin, levofloxacin, moxifloxacin, or ofloxacin (Brands include Factive, Cipro, Floxin, and Levaquin)    Cephalexin or any \"cef-\" antibiotic, such as cefazolin, cefdinir, cefuroxime, ceftriaxone, ceftazidime, or cefepime (Brands include Ancef, Ceftin, Fortaz, Keflex, Maxipime, Rocephin, and Simplicef)    Azithromycin or any \"-thromycin\" " "antibiotic, such as erythromycin or clarithromycin (Brands include Biaxin, Erythrocin, Z-harinder, and Zithromax)    Clindamycin or lincomycin (Brands include Cleocin and Lincocin)    No, not that I know of   When you had an allergic or bad reaction to penicillin or another \"-cillin\" antibiotic, did you have any of these symptoms? Select all that apply.    Raised, red, itchy spots with each spot lasting less than 24 hours   Not selected:    Swelling of the mouth, eyes, lips, or tongue    Blisters or ulcers on the lips, mouth, eyes, or vagina    _Peeling skin _    Breathing difficulties    Feeling light-headed or faint    A fast heartbeat    Clammy skin    Confusion and anxiety    Collapsing or losing consciousness    Joint pains    Problems with kidneys, lungs, or liver    None of the above   Have you had an allergic or bad reaction to any of these medications? Select all that apply.    No, not that I know of   Not selected:    Albuterol or a similar medication    Atropine    Corticosteroid (steroid) medication, including topical steroids, inhaled steroids, nasal steroids, or oral steroids (budesonide, ciclesonide, dexamethasone, flunisolide, fluticasone, methylprednisolone, triamcinolone, prednisone (or brand names Alvesco, Deltasone, Flovent, Medrol, Nasacort, Rhinocort, or Veramyst)    Metoclopramide (Reglan)    Ondansetron (Zuplenz, Zofran ODT, Zofran)    Prochlorperazine (Compazine)   Have you had an allergic or bad reaction to any of these eye drops, nasal sprays, or inhalers? Scroll to see all options. Select all that apply.    No, not that I know of   Not selected:    Azelastine (Astelin, Astepro, Optivar)    Cromolyn (Crolom, NasalCrom)    Ipratropium (Atrovent)    Ketotifen (Alaway, Zaditor)    Pheniramine/naphazoline (Naphcon-A, Opcon-A, Visine-A)    Olopatadine (Pataday, Patanol, Pazeo)   Have you had an allergic or bad reaction to any of these non-prescription medications? Scroll to see all options. Select " all that apply.    No, not that I know of   Not selected:    Acetaminophen (Tylenol)    Aspirin    Cetirizine (Zyrtec)    Dextromethorphan (Delsym, Robitussin, Vicks DayQuil Cough)    Diphenhydramine (Benadryl)    Fexofenadine (Allegra)    Guaifenesin (Mucinex)    Dextromethorphan (Delsym)    Ibuprofen (Advil, Motrin, Midol)    Loratadine (Alavert, Claritin)    Oxymetazoline (Afrin)    Phenylephrine (Sudafed PE)    Pseudoephedrine (Sudafed)   Are you allergic to milk or to the proteins found in milk (for example, whey or casein)? A milk allergy is different from lactose intolerance. Select one.    No, not that I know of   Not selected:    Yes   Have you ever had jaundice or liver problems as a result of taking azithromycin (Zithromax, Zmax)? Jaundice is a condition in which the skin and the whites of the eyes turn yellow. Select all that apply.    No, not that I know of   Not selected:    Yes, jaundice    Yes, liver problems   Do you need a doctor's note? A doctor's note confirms that you received care today and states when you can return to school or work. It does not contain information about your diagnosis or treatment plan. Your provider will make the final decision on whether to give you a doctor's note and for how long. Doctor's notes CANNOT be backdated. We can't provide medical leave paperwork through this type of visit. If more paperwork is needed to request time off, contact your primary care provider. Select one.    No   Not selected:    Today only (1 day)    Today and tomorrow (2 days)    3 days    5 days    7 days   Is there anything you'd like to add about your symptoms? Please limit your comments to the symptoms covered in this interview. If you include comments about other concerns, your provider may recommend that you be seen in person.   The patient did not enter any additional information.   ----------   Medical history   Medical history data does not currently exist for this patient.

## 2024-06-03 DIAGNOSIS — F51.04 PSYCHOPHYSIOLOGICAL INSOMNIA: ICD-10-CM

## 2024-06-03 RX ORDER — TRAZODONE HYDROCHLORIDE 100 MG/1
TABLET ORAL
Qty: 90 TABLET | Refills: 3 | Status: SHIPPED | OUTPATIENT
Start: 2024-06-03

## 2024-09-11 DIAGNOSIS — I10 HYPERTENSION, UNSPECIFIED TYPE: ICD-10-CM

## 2024-09-11 RX ORDER — AMLODIPINE BESYLATE 5 MG/1
TABLET ORAL
Qty: 90 TABLET | Refills: 3 | Status: SHIPPED | OUTPATIENT
Start: 2024-09-11

## 2024-10-04 ENCOUNTER — PATIENT MESSAGE (OUTPATIENT)
Dept: INTERNAL MEDICINE | Facility: CLINIC | Age: 49
End: 2024-10-04
Payer: COMMERCIAL

## 2024-10-04 DIAGNOSIS — Z87.898 HISTORY OF MOTION SICKNESS: Primary | ICD-10-CM

## 2024-10-07 RX ORDER — SCOLOPAMINE TRANSDERMAL SYSTEM 1 MG/1
1 PATCH, EXTENDED RELEASE TRANSDERMAL
Qty: 4 EACH | Refills: 0 | Status: SHIPPED | OUTPATIENT
Start: 2024-10-07

## 2024-12-03 NOTE — PROGRESS NOTES
Follow Up      Patient Name: Phuong Becker  : 1975   MRN: [id number]     Chief Complaint:    Chief Complaint   Patient presents with    Follow-up       History of Present Illness: Phuong Becker is a 49 y.o. female who is here today for follow up on Stanley's esophagus.     She is compliant with her PPI- twice a day. Rarely has any breakthrough heartburn.  No dysphagia.  Having normal bms.  No alcohol or tobacco.    No blood in her stool.  Normal BMs.  Appetite is good.        SCANNED - COLONOSCOPY (2023) (Dr Watts)-diverticulosis, non bleeding hemorrhoids- 5 year recall  ENDOSCOPY, INT (2023) (Dr Watts)-medium HH, short segment stanley's-biopsies + for stanley;s without dysplasia   ENDOSCOPY, INT (2019)- (Dr Watts)- gastritis, esophagitis, HH + for Stanley's- 3 year recall      Subjective      Review of Systems:   Review of Systems   Constitutional:  Negative for appetite change and unexpected weight loss.   HENT:  Negative for trouble swallowing.    Gastrointestinal:  Negative for abdominal distention, abdominal pain, anal bleeding, blood in stool, constipation, diarrhea, nausea, rectal pain, vomiting, GERD and indigestion.       Medications:     Current Outpatient Medications:     Albuterol-Budesonide 90-80 MCG/ACT aerosol, Inhale 2 Inhalations Every 2 (Two) Hours As Needed (cough, shortness of breath, wheezing)., Disp: 10.7 g, Rfl: 0    amLODIPine (NORVASC) 5 MG tablet, TAKE 1 TABLET DAILY, Disp: 90 tablet, Rfl: 3    betamethasone dipropionate 0.05 % lotion, Apply  topically to the appropriate area as directed 2 (Two) Times a Day As Needed for Irritation or Rash (ear irritation)., Disp: 60 mL, Rfl: 1    betamethasone valerate (VALISONE) 0.1 % cream, Apply 1 Application topically to the appropriate area as directed As Needed., Disp: , Rfl:     desvenlafaxine (PRISTIQ) 100 MG 24 hr tablet, Take 1 tablet by mouth Daily., Disp: 90 tablet, Rfl: 3    doxycycline  (MONODOX) 100 MG capsule, 1 po bid, Disp: 14 capsule, Rfl: 0    esomeprazole (nexIUM) 40 MG capsule, Take 1 capsule by mouth 2 (Two) Times a Day., Disp: 240 capsule, Rfl: 3    fluticasone (Flonase) 50 MCG/ACT nasal spray, 2 sprays into the nostril(s) as directed by provider Daily., Disp: 18.2 mL, Rfl: 11    promethazine-dextromethorphan (PROMETHAZINE-DM) 6.25-15 MG/5ML syrup, 1 tsp q 6h prn cough, Disp: 118 mL, Rfl: 0    Scopolamine 1 MG/3DAYS patch, Place 1 patch on the skin as directed by provider Every 72 (Seventy-Two) Hours., Disp: 4 each, Rfl: 0    traZODone (DESYREL) 100 MG tablet, Take 1/2 to 1 tablet 30 minutes before bedtime as needed for sleep., Disp: 90 tablet, Rfl: 3    Allergies:   Allergies   Allergen Reactions    Penicillins Hives       Social History:   Social History     Socioeconomic History    Marital status:    Tobacco Use    Smoking status: Former     Current packs/day: 0.00     Average packs/day: 0.5 packs/day for 25.0 years (12.5 ttl pk-yrs)     Types: Cigarettes     Start date: 1994     Quit date: 2019     Years since quittin.7    Smokeless tobacco: Never    Tobacco comments:     menthol   Vaping Use    Vaping status: Never Used   Substance and Sexual Activity    Alcohol use: Not Currently     Comment: rare    Drug use: No    Sexual activity: Yes     Birth control/protection: Post-menopausal, Surgical        Surgical History:   Past Surgical History:   Procedure Laterality Date    ENDOMETRIAL ABLATION      TOTAL ABDOMINAL HYSTERECTOMY WITH SALPINGO OOPHORECTOMY      UPPER GASTROINTESTINAL ENDOSCOPY          Medical History:   Past Medical History:   Diagnosis Date    Anemia     Dillard esophagus     Dillard's esophagus without dysplasia 2019    Continue omeprazole daily.     Depression     Diverticulitis of colon     Diverticulosis     Gastroesophageal reflux disease 2018    GERD (gastroesophageal reflux disease)         Objective     Physical Exam:  Vital  "Signs:   Vitals:    12/04/24 1020   BP: 110/70   BP Location: Right arm   Patient Position: Sitting   Cuff Size: Adult   Pulse: 106   Temp: 97.3 °F (36.3 °C)   TempSrc: Temporal   SpO2: 98%   Weight: 70.3 kg (155 lb)   Height: 162.6 cm (64\")     Body mass index is 26.61 kg/m².     Physical Exam  Constitutional:       General: She is not in acute distress.     Appearance: She is well-developed.   Pulmonary:      Effort: Pulmonary effort is normal. No accessory muscle usage or respiratory distress.   Abdominal:      General: Bowel sounds are normal. There is no distension.      Palpations: Abdomen is soft.   Lymphadenopathy:      Cervical: No cervical adenopathy.   Skin:     Coloration: Skin is not pale.      Findings: No erythema.   Neurological:      Mental Status: She is alert and oriented to person, place, and time.   Psychiatric:         Speech: Speech normal.         Behavior: Behavior normal.         Thought Content: Thought content normal.         Judgment: Judgment normal.         Assessment / Plan      Assessment/Plan:   Diagnoses and all orders for this visit:    1. Dillard's esophagus without dysplasia  -     esomeprazole (nexIUM) 40 MG capsule; Take 1 capsule by mouth 2 (Two) Times a Day.  Dispense: 240 capsule; Refill: 3    2. Gastroesophageal reflux disease with esophagitis without hemorrhage  -     esomeprazole (nexIUM) 40 MG capsule; Take 1 capsule by mouth 2 (Two) Times a Day.  Dispense: 240 capsule; Refill: 3     -     Stable, continue current treatment plan      Follow Up: 1 year  No follow-ups on file.    Plan of care reviewed with the patient at the conclusion of today's visit.  Education was provided regarding diagnosis, management, and any prescribed or recommended OTC medications.  Patient verbalized understanding of and agreement with management plan.         YOHANA Curry  [facility]  "

## 2024-12-04 ENCOUNTER — OFFICE VISIT (OUTPATIENT)
Dept: GASTROENTEROLOGY | Facility: CLINIC | Age: 49
End: 2024-12-04
Payer: COMMERCIAL

## 2024-12-04 VITALS
WEIGHT: 155 LBS | SYSTOLIC BLOOD PRESSURE: 110 MMHG | DIASTOLIC BLOOD PRESSURE: 70 MMHG | TEMPERATURE: 97.3 F | BODY MASS INDEX: 26.46 KG/M2 | HEIGHT: 64 IN | OXYGEN SATURATION: 98 % | HEART RATE: 106 BPM

## 2024-12-04 DIAGNOSIS — K21.00 GASTROESOPHAGEAL REFLUX DISEASE WITH ESOPHAGITIS WITHOUT HEMORRHAGE: ICD-10-CM

## 2024-12-04 DIAGNOSIS — K22.70 BARRETT'S ESOPHAGUS WITHOUT DYSPLASIA: ICD-10-CM

## 2024-12-04 PROCEDURE — 99213 OFFICE O/P EST LOW 20 MIN: CPT | Performed by: NURSE PRACTITIONER

## 2024-12-04 RX ORDER — ESOMEPRAZOLE MAGNESIUM 40 MG/1
40 CAPSULE, DELAYED RELEASE ORAL 2 TIMES DAILY
Qty: 240 CAPSULE | Refills: 3 | Status: SHIPPED | OUTPATIENT
Start: 2024-12-04

## 2024-12-04 RX ORDER — BETAMETHASONE VALERATE 1.2 MG/G
1 CREAM TOPICAL AS NEEDED
COMMUNITY
Start: 2024-11-20

## 2024-12-16 DIAGNOSIS — K22.70 BARRETT'S ESOPHAGUS WITHOUT DYSPLASIA: ICD-10-CM

## 2024-12-16 DIAGNOSIS — K21.00 GASTROESOPHAGEAL REFLUX DISEASE WITH ESOPHAGITIS WITHOUT HEMORRHAGE: ICD-10-CM

## 2024-12-16 RX ORDER — ESOMEPRAZOLE MAGNESIUM 40 MG/1
40 CAPSULE, DELAYED RELEASE ORAL 2 TIMES DAILY
Qty: 180 CAPSULE | Refills: 3 | Status: SHIPPED | OUTPATIENT
Start: 2024-12-16

## 2024-12-16 NOTE — TELEPHONE ENCOUNTER
Rx Refill Note  Pending Prescriptions:                       Disp   Refills    esomeprazole (nexIUM) 40 MG capsule [Pharm*180 ca*3        Sig: TAKE 1 CAPSULE TWICE A DAY    Last office visit with prescribing clinician: 12/4/2024   Last telemedicine visit with prescribing clinician: Visit date not found   Next office visit with prescribing clinician: Visit date not found         Charlee Ko MA  12/16/24, 08:22 EST

## 2025-02-20 DIAGNOSIS — F41.8 DEPRESSION WITH ANXIETY: ICD-10-CM

## 2025-02-20 RX ORDER — DESVENLAFAXINE 100 MG/1
100 TABLET, EXTENDED RELEASE ORAL DAILY
Qty: 34 TABLET | Refills: 0 | Status: SHIPPED | OUTPATIENT
Start: 2025-02-20

## 2025-03-24 ENCOUNTER — OFFICE VISIT (OUTPATIENT)
Dept: INTERNAL MEDICINE | Facility: CLINIC | Age: 50
End: 2025-03-24
Payer: COMMERCIAL

## 2025-03-24 VITALS
BODY MASS INDEX: 25.1 KG/M2 | TEMPERATURE: 98.2 F | HEIGHT: 64 IN | DIASTOLIC BLOOD PRESSURE: 80 MMHG | OXYGEN SATURATION: 96 % | WEIGHT: 147 LBS | HEART RATE: 112 BPM | SYSTOLIC BLOOD PRESSURE: 118 MMHG

## 2025-03-24 DIAGNOSIS — K21.00 GASTROESOPHAGEAL REFLUX DISEASE WITH ESOPHAGITIS WITHOUT HEMORRHAGE: ICD-10-CM

## 2025-03-24 DIAGNOSIS — F51.04 PSYCHOPHYSIOLOGICAL INSOMNIA: ICD-10-CM

## 2025-03-24 DIAGNOSIS — I10 PRIMARY HYPERTENSION: ICD-10-CM

## 2025-03-24 DIAGNOSIS — Z00.00 ENCOUNTER FOR ANNUAL PHYSICAL EXAMINATION EXCLUDING GYNECOLOGICAL EXAMINATION IN A PATIENT OLDER THAN 17 YEARS: Primary | ICD-10-CM

## 2025-03-24 DIAGNOSIS — E55.9 VITAMIN D DEFICIENCY: ICD-10-CM

## 2025-03-24 DIAGNOSIS — Z13.220 SCREENING FOR LIPID DISORDERS: ICD-10-CM

## 2025-03-24 DIAGNOSIS — F41.8 DEPRESSION WITH ANXIETY: ICD-10-CM

## 2025-03-24 DIAGNOSIS — K22.70 BARRETT'S ESOPHAGUS WITHOUT DYSPLASIA: ICD-10-CM

## 2025-03-24 PROCEDURE — 99396 PREV VISIT EST AGE 40-64: CPT | Performed by: NURSE PRACTITIONER

## 2025-03-24 RX ORDER — TRAZODONE HYDROCHLORIDE 150 MG/1
TABLET ORAL
Qty: 90 TABLET | Refills: 3 | Status: SHIPPED | OUTPATIENT
Start: 2025-03-24

## 2025-03-24 NOTE — PROGRESS NOTES
Chief Complaint   Patient presents with    Annual Exam       Phuong Becker is a 49 y.o. female and is here for a comprehensive physical exam.     HTN.  Taking amlodipine 5 mg daily as prescribed. Denies chest pain, dyspnea, orthopnea, palpitations, lower extremity edema, confusion, headaches, weakness, visual disturbances.    Dillard's esophagus.  Taking esomeprazole 40 mg as prescribed by GI, follows Dr. Watts.    Depression, anxiety, insomnia.  Taking desvenlafaxine 100 mg, trazodone 100 mg as prescribed. Does not feel trazodone is working as well as it did when she first started taking it. Dose has increased from 50 mg to 100 mg HS. she has taken amitriptyline in the past, left her feeling groggy the next day. Denies depressed mood, anhedonia, feelings of worthlessness, difficulty concentrating, impaired memory, SI, HI, panic attacks, weight change.     History:  LMP: No LMP recorded. Patient has had a hysterectomy.  Menopause at 41 years  Sexual activity:  not currently   : 4  Para: 3  Declines mammograms.  Does monthly self breast exams.  Feels her breasts are small and she would notice any abnormality with exam.  Her sisters and friends have discussed how uncomfortable/painful mammograms are. She does not have FH of breast cancer and does not feel mammogram is necessary.        Health Habits:  Dental Exam. up to date  Eye Exam. not up to date - wears readers  Exercise: 0 times/week.  Current exercise activities include: none  Diet: well balanced, typical American    Health Maintenance   Topic Date Due    BMI FOLLOWUP  2025    LIPID PANEL  2025    COVID-19 Vaccine (3 - 2024-25 season) 2025 (Originally 2024)    INFLUENZA VACCINE  2025 (Originally 2024)    MAMMOGRAM  2026 (Originally 2017)    ANNUAL PHYSICAL  2026    COLORECTAL CANCER SCREENING  2028    TDAP/TD VACCINES (3 - Td or Tdap) 2031    HEPATITIS C SCREENING  Completed     Pneumococcal Vaccine 0-49  Aged Out    PAP SMEAR  Discontinued       PMH, PSH, SocHx, FamHx, Allergies, and Medications: Reviewed and updated in the Visit Navigator.     Allergies   Allergen Reactions    Penicillins Hives     Past Medical History:   Diagnosis Date    Anemia     Dillard esophagus     Dillard's esophagus without dysplasia 2019    Continue omeprazole daily.     Depression     Diverticulitis of colon     Diverticulosis     Gastroesophageal reflux disease 2018    GERD (gastroesophageal reflux disease)      Past Surgical History:   Procedure Laterality Date    ENDOMETRIAL ABLATION      TOTAL ABDOMINAL HYSTERECTOMY WITH SALPINGO OOPHORECTOMY      UPPER GASTROINTESTINAL ENDOSCOPY       Social History     Socioeconomic History    Marital status:    Tobacco Use    Smoking status: Former     Current packs/day: 0.00     Average packs/day: 0.5 packs/day for 25.0 years (12.5 ttl pk-yrs)     Types: Cigarettes     Start date: 1994     Quit date: 2019     Years since quittin.0    Smokeless tobacco: Never    Tobacco comments:     menthol   Vaping Use    Vaping status: Never Used   Substance and Sexual Activity    Alcohol use: Not Currently     Comment: rare    Drug use: No    Sexual activity: Yes     Birth control/protection: Post-menopausal, Surgical     Family History   Problem Relation Age of Onset    SHAHID disease Mother     Hypertension Mother     Hyperlipidemia Mother     Osteoporosis Mother     Migraines Sister     Cervical cancer Maternal Aunt     Ovarian cancer Maternal Aunt     Diabetes Maternal Aunt     Diabetes Maternal Uncle     Diabetes Paternal Aunt     Diabetes Paternal Uncle     Hypertension Paternal Uncle     Heart attack Cousin     Colon cancer Neg Hx     Colon polyps Neg Hx     Esophageal cancer Neg Hx        Review of Systems  Review of Systems   All other systems reviewed and are negative.      Objective   /80   Pulse 112   Temp 98.2 °F (36.8 °C)   Ht 162.6  "cm (64.02\")   Wt 66.7 kg (147 lb)   SpO2 96%   BMI 25.22 kg/m²   Body mass index is 25.22 kg/m².    Physical Exam  Constitutional:       Appearance: She is well-developed. She is not ill-appearing.   HENT:      Head: Normocephalic.      Right Ear: Tympanic membrane, ear canal and external ear normal.      Left Ear: Tympanic membrane, ear canal and external ear normal.      Nose: Nose normal.      Mouth/Throat:      Mouth: Mucous membranes are moist.      Pharynx: Oropharynx is clear. Uvula midline.   Eyes:      Extraocular Movements: Extraocular movements intact.      Conjunctiva/sclera: Conjunctivae normal.      Pupils: Pupils are equal, round, and reactive to light.   Neck:      Thyroid: No thyromegaly.   Cardiovascular:      Rate and Rhythm: Normal rate and regular rhythm.      Heart sounds: Normal heart sounds.   Pulmonary:      Effort: Pulmonary effort is normal.      Breath sounds: Normal breath sounds.   Abdominal:      General: Bowel sounds are normal.      Palpations: Abdomen is soft.      Tenderness: There is no abdominal tenderness.   Musculoskeletal:         General: No tenderness or deformity. Normal range of motion.      Cervical back: Full passive range of motion without pain, normal range of motion and neck supple.   Lymphadenopathy:      Cervical: No cervical adenopathy.   Skin:     General: Skin is warm.      Capillary Refill: Capillary refill takes less than 2 seconds.   Neurological:      Mental Status: She is alert and oriented to person, place, and time.      Sensory: No sensory deficit.      Coordination: Coordination normal.      Gait: Gait normal.      Comments: CN II-XII normal   Psychiatric:         Attention and Perception: Attention normal.         Mood and Affect: Mood and affect normal.         Speech: Speech normal.         Behavior: Behavior normal.         Thought Content: Thought content normal.       The 10-year ASCVD risk score (Norberto CHATTERJEE, et al., 2019) is: 1.7%    Values used " to calculate the score:      Age: 49 years      Sex: Female      Is Non- : No      Diabetic: No      Tobacco smoker: No      Systolic Blood Pressure: 118 mmHg      Is BP treated: Yes      HDL Cholesterol: 39 mg/dL      Total Cholesterol: 192 mg/dL      Assessment & Plan   1. Healthy female exam.    2. Patient Counseling: Including but not Limited to the following, when appropriate:  --Nutrition: Stressed importance of moderation in sodium/caffeine intake, saturated fat and cholesterol, caloric balance, sufficient intake of fresh fruits, vegetables, fiber, calcium, iron, and 1 mg of folate supplement per day (for females capable of pregnancy).  --Exercise: Stressed the importance of regular exercise.   --Substance Abuse: Discussed cessation/primary prevention of tobacco, alcohol, or other drug use; driving or other dangerous activities under the influence; availability of treatment for abuse, as indicated based on social history.    --Sexuality: Discussed sexually transmitted diseases, partner selection, use of condoms, avoidance of unintended pregnancy  and contraceptive alternatives.   --Injury prevention: Discussed safety belts, safety helmets, smoke detector, smoking near bedding or upholstery.   --Dental health: Discussed importance of regular tooth brushing, flossing, and dental visits.  --Immunizations reviewed.  --Discussed benefits of colon cancer screening.  3. Discussed the patient's BMI with her.  The BMI is above average; BMI management plan is completed  4. Return in about 1 year (around 3/24/2026) for Annual.  5. Age-appropriate Screening Scheduled    Assessment & Plan     Diagnoses and all orders for this visit:    1. Encounter for annual physical examination excluding gynecological examination in a patient older than 17 years (Primary)    2. Psychophysiological insomnia  -     traZODone (DESYREL) 150 MG tablet; Take 1/2 to 1 tablet 30 minutes before bedtime as needed for  sleep.  Dispense: 90 tablet; Refill: 3.  If ineffective at this dose, will consider increasing dose or trying another medication.   3. Depression with anxiety        - Encouraged to take part in daily physical exercise.          - Eat healthy, well balanced diet; avoid sugary foods or beverages        - Continue to abstain from alcohol and drugs        - Ensure good night's sleep by creating calm space in bedroom, avoiding screen time 1-2 hours before bed, no caffeine after 5 pm        - Talk to supportive family and friends, as needed        - Continue desvenlafaxine as prescribed.    4. Primary hypertension  -     Comprehensive Metabolic Panel        - Follow heart healthy diet.  Keep sodium intake < 1500 mg per day.  Avoid processed & fast foods.          - Exercise as tolerated, with a goal of 30 minutes of moderate exercise most days.         - Take medications as prescribed.    5. Dillard's esophagus without dysplasia  6. Gastroesophageal reflux disease with esophagitis without hemorrhage        - Avoid triggers such as spicy or greasy food, alcohol, chocolate, caffeine, carbonated beverages, tomatoes and tomato sauces, onions, smoking        - May raise HOB 30 degrees with risers or blocks, if desired        - Do not eat within 2-3 hours of lying down        - Avoid clothing, belts that constrict midsection        - Continue PPI as prescribed by GI.    7. Vitamin D deficiency  -     Vitamin D,25-Hydroxy    8. Screening for lipid disorders  -     Lipid Panel    9. BMI 25.0-25.9,adult       - BMI is >= 25 and <30. (Overweight) The following options were offered after discussion;: exercise counseling/recommendations and nutrition counseling/recommendations

## 2025-05-19 DIAGNOSIS — F41.8 DEPRESSION WITH ANXIETY: ICD-10-CM

## 2025-05-19 RX ORDER — DESVENLAFAXINE 100 MG/1
100 TABLET, EXTENDED RELEASE ORAL DAILY
Qty: 90 TABLET | Refills: 3 | Status: SHIPPED | OUTPATIENT
Start: 2025-05-19

## 2025-05-19 NOTE — TELEPHONE ENCOUNTER
Rx Refill Note  Do you want patient to continue this medication?  Requested Prescriptions     Pending Prescriptions Disp Refills    desvenlafaxine (PRISTIQ) 100 MG 24 hr tablet [Pharmacy Med Name: DESVENLAFAXINE SUCCINATE ER TABS 100MG] 34 tablet 9     Sig: TAKE 1 TABLET DAILY      Last office visit with prescribing clinician: 3/24/2025   Last telemedicine visit with prescribing clinician: Visit date not found   Next office visit with prescribing clinician: Visit date not found                         Would you like a call back once the refill request has been completed: [] Yes [] No    If the office needs to give you a call back, can they leave a voicemail: [] Yes [] No    Cyrus Jimenez, JACKIE  05/19/25, 11:38 EDT

## 2025-05-20 DIAGNOSIS — F51.04 PSYCHOPHYSIOLOGICAL INSOMNIA: ICD-10-CM

## 2025-05-20 RX ORDER — TRAZODONE HYDROCHLORIDE 150 MG/1
TABLET ORAL
Qty: 90 TABLET | Refills: 3 | Status: SHIPPED | OUTPATIENT
Start: 2025-05-20